# Patient Record
Sex: FEMALE | Race: WHITE | NOT HISPANIC OR LATINO
[De-identification: names, ages, dates, MRNs, and addresses within clinical notes are randomized per-mention and may not be internally consistent; named-entity substitution may affect disease eponyms.]

---

## 2020-10-02 PROBLEM — Z00.00 ENCOUNTER FOR PREVENTIVE HEALTH EXAMINATION: Status: ACTIVE | Noted: 2020-10-02

## 2020-10-07 ENCOUNTER — APPOINTMENT (OUTPATIENT)
Dept: BREAST CENTER | Facility: CLINIC | Age: 65
End: 2020-10-07
Payer: MEDICARE

## 2020-10-07 VITALS
HEART RATE: 72 BPM | WEIGHT: 144 LBS | BODY MASS INDEX: 26.5 KG/M2 | DIASTOLIC BLOOD PRESSURE: 84 MMHG | HEIGHT: 62 IN | SYSTOLIC BLOOD PRESSURE: 133 MMHG

## 2020-10-07 DIAGNOSIS — Z87.39 PERSONAL HISTORY OF OTHER DISEASES OF THE MUSCULOSKELETAL SYSTEM AND CONNECTIVE TISSUE: ICD-10-CM

## 2020-10-07 DIAGNOSIS — Z87.2 PERSONAL HISTORY OF DISEASES OF THE SKIN AND SUBCUTANEOUS TISSUE: ICD-10-CM

## 2020-10-07 DIAGNOSIS — Z87.891 PERSONAL HISTORY OF NICOTINE DEPENDENCE: ICD-10-CM

## 2020-10-07 DIAGNOSIS — Z86.79 PERSONAL HISTORY OF OTHER DISEASES OF THE CIRCULATORY SYSTEM: ICD-10-CM

## 2020-10-07 DIAGNOSIS — Z78.9 OTHER SPECIFIED HEALTH STATUS: ICD-10-CM

## 2020-10-07 PROCEDURE — 99205 OFFICE O/P NEW HI 60 MIN: CPT

## 2020-10-07 RX ORDER — ATENOLOL 50 MG/1
TABLET ORAL
Refills: 0 | Status: ACTIVE | COMMUNITY

## 2020-10-07 RX ORDER — SIMVASTATIN 80 MG/1
TABLET, FILM COATED ORAL
Refills: 0 | Status: ACTIVE | COMMUNITY

## 2020-10-07 RX ORDER — LORAZEPAM 2 MG/1
TABLET ORAL
Refills: 0 | Status: ACTIVE | COMMUNITY

## 2020-10-07 NOTE — CONSULT LETTER
[Dear  ___] : Dear  [unfilled], [( Thank you for referring [unfilled] for consultation for _____ )] : Thank you for referring [unfilled] for consultation for [unfilled] [Please see my note below.] : Please see my note below. [Consult Closing:] : Thank you very much for allowing me to participate in the care of this patient.  If you have any questions, please do not hesitate to contact me. [Sincerely,] : Sincerely, [FreeTextEntry3] : Alexsandra London MS DO\par Breast Surgeon\par [facility] \[place] [address]\par  [DrTara  ___] : Dr. SORENSEN

## 2020-10-07 NOTE — HISTORY OF PRESENT ILLNESS
[FreeTextEntry1] : This is a 64 year old female referred by Dr. Calle for a newly diagnosed right breast DCIS. She presented for screening imaging 9/9/2020 and an interval development of right breast microcalcifications was seen spanning [id number]. She is s/p three site stereotactic bx at [facility] on 9/23/2020 pathology of 12:00 posterior (top hat clip) showed DCIS, high grade; right 12:00 anterior (booker clip) DCIS, high grade; right 12N4 (barbell clip) DCIS high grade. The DCIS was thought to be histologically similar and receptors were ran on the last specimen and were ER 0%, NV 0%. She presents today for surgical consult.\par Denies any trauma, changes to herbs, diet or supplements. She has never had prior breast biopsy.\par  \par She does SBE. \par She has not noticed a change in her breast or a breast lump.\par She has not noticed a change in her nipple or nipple area.\par She has not noticed a change in the skin of the breast. except post bx bruising\par She is experiencing occasional clear left nipple discharge.\par She is not experiencing breast pain.\par She has not noticed a lump or lymph node under the armpit. \par \par BREAST CANCER RISK FACTORS\par Menarche: 10\par Date of LMP: 2005\par Menopause: post age 54\par Grav:  3    Para: 2 (41 yo son , 30 yo daughter ) \par Age at first live birth: 23 \par Nursed: no\par Hysterectomy: no\par Oophorectomy: yes partial left 1992\par OCP: yes in the past for about 2 years (IUD)\par HRT: no \par Last pap/pelvic exam: 2019 WNL \par Related family history: 2 maternal cousins DCIS age 59, and age 68\par Ashkenazi: no\par Mastery risk assessment: N/A\par BRCA testing: both 1st cousins, negative \par Bra size: 36C\par  \par Last mammogram: 9/9/2020                [facility]: [facility] \par Report reviewed.                                 Images reviewed on PACS and with patient\par Results: BIRADS 4\par heterogeneously dense breast tissue. Faint microcalicifications located in the right breast at approximately the 12:00 axis of the breast which extend over approximately 4cm in length and begins approximately 4cm from the nipple. The calcifications are mildly pleomorphic and vary somewhat in density and size without linear or branching forms identified. \par \par Last ultrasound:  9/9/2020                                 Location: [facility] \par Report reviewed.                                 Images reviewed in PACS\par Results: BIRADS 4\par No suspicious solid or cystic mass is identified. \par  \par Last MRI:  never                                            Location:\par Report reviewed.\par

## 2020-10-07 NOTE — REVIEW OF SYSTEMS
[Feeling Tired] : feeling tired [Recent Weight Gain (___ Lbs)] : recent [unfilled] ~Ulb weight gain [Night Sweats] : night sweats [Red Eyes] : red eyes [Eyesight Problems] : eyesight problems [Dry Eyes] : dryness of the eyes [Heart Rate Is Fast] : fast heart rate [Palpitations] : palpitations [Cough] : cough [Abdominal Pain] : abdominal pain [Diarrhea] : diarrhea [Heartburn] : heartburn [Joint Pain] : joint pain [Joint Swelling] : joint swelling [Joint Stiffness] : joint stiffness [Hand Pain] : hand pain [Hand Stiffness] : stiffness of the hand [Mid Back Pain] : mid back pain [Upper Back Pain] : upper back pain [Nipple Discharge] : nipple discharge [Dizziness] : dizziness [Sleep Disturbances] : sleep disturbances [Anxiety] : anxiety [Hot Flashes] : hot flashes [Decrease in Strength] : generalized decrease in strength [Loss of Hair] : loss of hair [Easy Bruising] : a tendency for easy bruising [Swollen Glands] : swollen glands [Negative] : Genitourinary

## 2020-10-07 NOTE — PHYSICAL EXAM
[Normocephalic] : normocephalic [Atraumatic] : atraumatic [Supple] : supple [No Supraclavicular Adenopathy] : no supraclavicular adenopathy [Examined in the supine and seated position] : examined in the supine and seated position [Symmetrical] : symmetrical [No dominant masses] : no dominant masses left breast [No Nipple Retraction] : no left nipple retraction [No Nipple Discharge] : no left nipple discharge [No Axillary Lymphadenopathy] : no left axillary lymphadenopathy [No Edema] : no edema [No Rashes] : no rashes [No Ulceration] : no ulceration [de-identified] : hematoma 12:00 mobile

## 2020-10-07 NOTE — ASSESSMENT
[FreeTextEntry1] : The pathology and imaging results were discussed. She is a stage 0 DCIS, ER- (AJCC 8th edition).\par \par An MRI is recommended for further evaluation of disease extent and the possible presence of multicentric or contralateral disease.  And to evaluate the extent of DCIS.\par \par The use of multimodality therapy for the treatment of breast cancer was discussed.  The surgical options include a lumpectomy to negative margins with radiation therapy versus a mastectomy with or without reconstruction.  Mastectomy techniques were discussed in detail including skin sparing, areolar sparing and nipple sparing techniques.  Long term follow up of nipple sparing techniques are not yet available.  The risks for nipple loss and high likelihood of sensation loss were discussed. The intranipple tissue would be examined pathologically and if any cancer of abnormal cells were found, a subsequent nipple excision would be recommended.\par \par Reconstructive options were discussed with the risks and benefits to each.  Implant based reconstruction with expander versus direct to implant techniques with/without alloderm were discussed as well as tissue flap procedures.  Reconstruction must be covered under state and federal laws.  Referral to a reconstructive surgeon was offered.\par Axillary staging is not generally necessary for DCIS.  There are certain situations where it  may be advisable to evaluate for axillary roula spread.  These include palpable DCIS, extensive DCIS especially with comedo necrosis (high grade), or when a mastectomy is planned.  The reasoning being that there is a higher chance of finding invasive disease in these circumstances or that when a mastectomy is performed, the sentinel node biopsy must be performed at the same time since the surgery disrupts the lymphatic flow and subsequent sentinel node evaluation would not be accurate.  If the sentinel lymph node is found to have metastatic disease either on the intra operative evaluation or on the final pathology, an axillary dissection may be recommended.  There is evidence that it may be necessary to complete an axillary dissection if one or two sentinel nodes are positive if the invasive cancer is less than 5 cm and treated by lumpectomy and conventional tangential field radiation.  The risks and benefits of surgery were discussed.\par \par The general indications for the use of chemotherapy were discussed but is dependent on the pathology results.  Chemotherapy would not be indicated for DCIS.  Hormonal therapy may be advised if the disease is ER+.  Not only can it help prevent recurrence of the already diagnosed cancer, it can help reduce the risk of a new primary tumor.  The patient will need to meet with a medical oncologist once those results of surgery are available.  The indications for radiation therapy and side effects were also discussed including use of accelerated and partial breast techniques.  The patient will need to meet with a radiation therapist if radiation is recommended.  The genetics of breast cancer were discussed with the implications for risk contralateral cancer and other associated cancers, options for management, and implications for family members. She is declining genetic testing at this time.\par Questions were answered. She received a cancer binder. \par I will call her after 2nd op pathology review and MRI to discuss her eligibility for breast conserving surgery. She would prefer BCS over mastectomy.\par She knows to call or return sooner should any concerns or questions arise.\par

## 2020-10-07 NOTE — PHYSICAL EXAM
[Normocephalic] : normocephalic [Atraumatic] : atraumatic [Supple] : supple [No Supraclavicular Adenopathy] : no supraclavicular adenopathy [Examined in the supine and seated position] : examined in the supine and seated position [Symmetrical] : symmetrical [No dominant masses] : no dominant masses left breast [No Nipple Retraction] : no left nipple retraction [No Nipple Discharge] : no left nipple discharge [No Axillary Lymphadenopathy] : no left axillary lymphadenopathy [No Edema] : no edema [No Rashes] : no rashes [No Ulceration] : no ulceration [de-identified] : hematoma 12:00 mobile

## 2020-10-07 NOTE — HISTORY OF PRESENT ILLNESS
[FreeTextEntry1] : This is a 64 year old female referred by Dr. Calle for a newly diagnosed right breast DCIS. She presented for screening imaging 9/9/2020 and an interval development of right breast microcalcifications was seen spanning [id number]. She is s/p three site stereotactic bx at [facility] on 9/23/2020 pathology of 12:00 posterior (top hat clip) showed DCIS, high grade; right 12:00 anterior (booker clip) DCIS, high grade; right 12N4 (barbell clip) DCIS high grade. The DCIS was thought to be histologically similar and receptors were ran on the last specimen and were ER 0%, DE 0%. She presents today for surgical consult.\par Denies any trauma, changes to herbs, diet or supplements. She has never had prior breast biopsy.\par  \par She does SBE. \par She has not noticed a change in her breast or a breast lump.\par She has not noticed a change in her nipple or nipple area.\par She has not noticed a change in the skin of the breast. except post bx bruising\par She is experiencing occasional clear left nipple discharge.\par She is not experiencing breast pain.\par She has not noticed a lump or lymph node under the armpit. \par \par BREAST CANCER RISK FACTORS\par Menarche: 10\par Date of LMP: 2005\par Menopause: post age 54\par Grav:  3    Para: 2 (43 yo son , 30 yo daughter ) \par Age at first live birth: 23 \par Nursed: no\par Hysterectomy: no\par Oophorectomy: yes partial left 1992\par OCP: yes in the past for about 2 years (IUD)\par HRT: no \par Last pap/pelvic exam: 2019 WNL \par Related family history: 2 maternal cousins DCIS age 59, and age 68\par Ashkenazi: no\par Mastery risk assessment: N/A\par BRCA testing: both 1st cousins, negative \par Bra size: 36C\par  \par Last mammogram: 9/9/2020                [facility]: [facility] \par Report reviewed.                                 Images reviewed on PACS and with patient\par Results: BIRADS 4\par heterogeneously dense breast tissue. Faint microcalicifications located in the right breast at approximately the 12:00 axis of the breast which extend over approximately 4cm in length and begins approximately 4cm from the nipple. The calcifications are mildly pleomorphic and vary somewhat in density and size without linear or branching forms identified. \par \par Last ultrasound:  9/9/2020                                 Location: [facility] \par Report reviewed.                                 Images reviewed in PACS\par Results: BIRADS 4\par No suspicious solid or cystic mass is identified. \par  \par Last MRI:  never                                            Location:\par Report reviewed.\par

## 2020-10-07 NOTE — CONSULT LETTER
[Dear  ___] : Dear  [unfilled], [( Thank you for referring [unfilled] for consultation for _____ )] : Thank you for referring [unfilled] for consultation for [unfilled] [Please see my note below.] : Please see my note below. [Consult Closing:] : Thank you very much for allowing me to participate in the care of this patient.  If you have any questions, please do not hesitate to contact me. [Sincerely,] : Sincerely, [FreeTextEntry3] : Alexsandra London MS DO\par Breast Surgeon\par [facility] [place] \[place] [address]\par  [DrTara  ___] : Dr. SORENSEN

## 2020-10-21 ENCOUNTER — NON-APPOINTMENT (OUTPATIENT)
Age: 65
End: 2020-10-21

## 2020-10-22 ENCOUNTER — NON-APPOINTMENT (OUTPATIENT)
Age: 65
End: 2020-10-22

## 2020-10-26 ENCOUNTER — APPOINTMENT (OUTPATIENT)
Dept: HEMATOLOGY ONCOLOGY | Facility: CLINIC | Age: 65
End: 2020-10-26
Payer: MEDICARE

## 2020-10-26 PROCEDURE — 99072 ADDL SUPL MATRL&STAF TM PHE: CPT

## 2020-10-26 PROCEDURE — 99499A: CUSTOM | Mod: NC

## 2020-11-17 ENCOUNTER — NON-APPOINTMENT (OUTPATIENT)
Age: 65
End: 2020-11-17

## 2020-12-04 ENCOUNTER — APPOINTMENT (OUTPATIENT)
Dept: BREAST CENTER | Facility: CLINIC | Age: 65
End: 2020-12-04
Payer: MEDICARE

## 2020-12-04 VITALS
SYSTOLIC BLOOD PRESSURE: 151 MMHG | HEART RATE: 77 BPM | BODY MASS INDEX: 27.6 KG/M2 | HEIGHT: 62 IN | DIASTOLIC BLOOD PRESSURE: 89 MMHG | WEIGHT: 150 LBS

## 2020-12-04 DIAGNOSIS — Z80.1 FAMILY HISTORY OF MALIGNANT NEOPLASM OF TRACHEA, BRONCHUS AND LUNG: ICD-10-CM

## 2020-12-04 PROCEDURE — 93702 BIS XTRACELL FLUID ANALYSIS: CPT

## 2020-12-04 PROCEDURE — 99215 OFFICE O/P EST HI 40 MIN: CPT | Mod: 25

## 2020-12-04 PROCEDURE — 99072 ADDL SUPL MATRL&STAF TM PHE: CPT

## 2020-12-04 NOTE — CONSULT LETTER
[Dear  ___] : Dear  [unfilled], [( Thank you for referring [unfilled] for consultation for _____ )] : Thank you for referring [unfilled] for consultation for [unfilled] [Please see my note below.] : Please see my note below. [Consult Closing:] : Thank you very much for allowing me to participate in the care of this patient.  If you have any questions, please do not hesitate to contact me. [Sincerely,] : Sincerely, [DrTara  ___] : Dr. SORENSEN [FreeTextEntry3] : Alexsandra London MS DO\par Breast Surgeon\par [facility] \[place] [address]\par

## 2020-12-04 NOTE — HISTORY OF PRESENT ILLNESS
[FreeTextEntry1] : This is a 65 year old female referred by Dr. Calle for a newly diagnosed right breast DCIS. She presented for screening imaging 9/9/2020 and an interval development of right breast microcalcifications was seen spanning [id number]. She is s/p three site stereotactic bx at [facility] on 9/23/2020 pathology of 12:00 posterior (top hat clip) showed DCIS, high grade; right 12:00 anterior (booker clip) DCIS, high grade; right 12N4 (barbell clip) DCIS high grade. The DCIS was thought to be histologically similar and receptors were ran on the last specimen and were ER 0%, AL 0%. She presents today for surgical consult.\par Denies any trauma, changes to herbs, diet or supplements. She has never had prior breast biopsy.\par Her sister has just been diagnosed with stage 3 lung cancer.\par \par She does SBE. \par She has not noticed a change in her breast or a breast lump.\par She has not noticed a change in her nipple or nipple area.\par She has not noticed a change in the skin of the breast. except post bx bruising\par She is experiencing occasional clear left nipple discharge.\par She is not experiencing breast pain.\par She has not noticed a lump or lymph node under the armpit. \par \par BREAST CANCER RISK FACTORS\par Menarche: 10\par Date of LMP: 2005\par Menopause: post age 54\par Grav:  3    Para: 2 (41 yo son , 28 yo daughter ) \par Age at first live birth: 23 \par Nursed: no\par Hysterectomy: no\par Oophorectomy: yes partial left 1992\par OCP: yes in the past for about 2 years (IUD)\par HRT: no \par Last pap/pelvic exam: 2019 WNL \par Related family history: 2 maternal cousins DCIS age 59, and age 68\par Ashkenazi: no\par Mastery risk assessment: N/A\par BRCA testing: Negative VUS DESIRE and BRCA2 \par Bra size: 36C\par  \par Last mammogram: 9/9/2020                Location: [facility] \par Report reviewed.                                 Images reviewed on PACS and with patient\par Results: BIRADS 4\par heterogeneously dense breast tissue. Faint microcalicifications located in the right breast at approximately the 12:00 axis of the breast which extend over approximately 4cm in length and begins approximately 4cm from the nipple. The calcifications are mildly pleomorphic and vary somewhat in density and size without linear or branching forms identified. \par \par Last ultrasound:  9/9/2020                                 Location: [facility] \par Report reviewed.                                 Images reviewed in PACS\par Results: BIRADS 4\par No suspicious solid or cystic mass is identified. \par  \par Last MRI:  10/21/2020                                            Location: [facility]\par Report reviewed.\par Results: right breast BIRADS 6 Left breast BIRADS 1\par right breast:nonmasslike, branching enhancement in the upper inner quadrant extending into the upper outer quadrant, demarcated by 3, concordant with the biopsy-proven area of DCIS.\par left breast: unremarkable, no suspicious findings.

## 2020-12-04 NOTE — ASSESSMENT
[FreeTextEntry1] : The pathology and imaging results were discussed. She is a right stage 0 DCIS, ER- (AJCC 8th edition).\par \par Magtrace injection was reviewed. Discussed if microinvasive or invasive cancer is found on pathology then sentinel lymph node biopsy would be recommended.\par \par Reviewed incisions\par plan nipple delay\par plan right NSM/WENDY\par A review postop care and recovery. I reviewed the risk of infection, bleeding, hematoma, seroma, deformity, scarring, and change in sensation, particularly in the nipple, with possible loss of sensation. She understands all of the above all of her questions have been answered.\par

## 2020-12-08 ENCOUNTER — APPOINTMENT (OUTPATIENT)
Dept: BREAST CENTER | Facility: HOSPITAL | Age: 65
End: 2020-12-08
Payer: MEDICARE

## 2020-12-08 PROCEDURE — 14000 TIS TRNFR TRUNK 10 SQ CM/<: CPT

## 2020-12-08 PROCEDURE — 19120 REMOVAL OF BREAST LESION: CPT | Mod: RT,59

## 2020-12-15 ENCOUNTER — NON-APPOINTMENT (OUTPATIENT)
Age: 65
End: 2020-12-15

## 2020-12-16 ENCOUNTER — APPOINTMENT (OUTPATIENT)
Dept: BREAST CENTER | Facility: CLINIC | Age: 65
End: 2020-12-16
Payer: MEDICARE

## 2020-12-16 PROCEDURE — 99024 POSTOP FOLLOW-UP VISIT: CPT

## 2020-12-16 NOTE — ASSESSMENT
[FreeTextEntry1] : doing well\par path reviewed copy given\par for nsm 12/18 and magtrace\par She knows to call or return sooner should any concerns or questions arise.\par

## 2020-12-16 NOTE — HISTORY OF PRESENT ILLNESS
[FreeTextEntry1] : This is a 65 year old female referred by Dr. Calle for a newly diagnosed right breast DCIS. She presented for screening imaging 9/9/2020 and an interval development of right breast microcalcifications was seen spanning [id number]. She is s/p three site stereotactic bx at [facility] on 9/23/2020 pathology of 12:00 posterior (top hat clip) showed DCIS, high grade; right 12:00 anterior (booker clip) DCIS, high grade; right 12N4 (barbell clip) DCIS high grade. The DCIS was thought to be histologically similar and receptors were ran on the last specimen and were ER 0%, MD 0%. She presents today for surgical consult.\par Denies any trauma, changes to herbs, diet or supplements. She has never had prior breast biopsy.\par Her sister has just been diagnosed with stage 3 lung cancer.\par \par She is s/p right nipple delay 12/8/20 path shows benign breast nipple duct tissue. She has no post op complaints.\par \par She does SBE. \par She has not noticed a change in her breast or a breast lump.\par She has not noticed a change in her nipple or nipple area.\par She has not noticed a change in the skin of the breast. except post bx bruising\par She is experiencing occasional clear left nipple discharge.\par She is not experiencing breast pain.\par She has not noticed a lump or lymph node under the armpit. \par \par BREAST CANCER RISK FACTORS\par Menarche: 10\par Date of LMP: 2005\par Menopause: post age 54\par Grav:  3    Para: 2 (43 yo son , 30 yo daughter ) \par Age at first live birth: 23 \par Nursed: no\par Hysterectomy: no\par Oophorectomy: yes partial left 1992\par OCP: yes in the past for about 2 years (IUD)\par HRT: no \par Last pap/pelvic exam: 2019 WNL \par Related family history: 2 maternal cousins DCIS age 59, and age 68\par Ashkenazi: no\par Mastery risk assessment: N/A\par BRCA testing: Negative VUS DESIRE and BRCA2 \par Bra size: 36C\par  \par Last mammogram: 9/9/2020                Location: [facility] \par Report reviewed.                                 Images reviewed on PACS and with patient\par Results: BIRADS 4\par heterogeneously dense breast tissue. Faint microcalicifications located in the right breast at approximately the 12:00 axis of the breast which extend over approximately 4cm in length and begins approximately 4cm from the nipple. The calcifications are mildly pleomorphic and vary somewhat in density and size without linear or branching forms identified. \par \par Last ultrasound:  9/9/2020                                 Location: [facility] \par Report reviewed.                                 Images reviewed in PACS\par Results: BIRADS 4\par No suspicious solid or cystic mass is identified. \par  \par Last MRI:  10/21/2020                                            Location: [facility]\par Report reviewed.\par Results: right breast BIRADS 6 Left breast BIRADS 1\par right breast:nonmasslike, branching enhancement in the upper inner quadrant extending into the upper outer quadrant, demarcated by 3, concordant with the biopsy-proven area of DCIS.\par left breast: unremarkable, no suspicious findings.

## 2020-12-18 ENCOUNTER — APPOINTMENT (OUTPATIENT)
Dept: BREAST CENTER | Facility: HOSPITAL | Age: 65
End: 2020-12-18
Payer: MEDICARE

## 2020-12-18 PROCEDURE — 19303 MAST SIMPLE COMPLETE: CPT | Mod: RT,78,22

## 2020-12-18 PROCEDURE — 38790 INJECT FOR LYMPHATIC X-RAY: CPT | Mod: RT,59,78

## 2020-12-23 ENCOUNTER — APPOINTMENT (OUTPATIENT)
Dept: BREAST CENTER | Facility: CLINIC | Age: 65
End: 2020-12-23
Payer: MEDICARE

## 2020-12-23 PROCEDURE — 99024 POSTOP FOLLOW-UP VISIT: CPT

## 2020-12-23 NOTE — HISTORY OF PRESENT ILLNESS
[FreeTextEntry1] : This is a 65 year old female referred by Dr. Calle for a newly diagnosed right breast DCIS. She presented for screening imaging 9/9/2020 and an interval development of right breast microcalcifications was seen spanning [id number]. She is s/p three site stereotactic bx at [facility] on 9/23/2020 pathology of 12:00 posterior (top hat clip) showed DCIS, high grade; right 12:00 anterior (booker clip) DCIS, high grade; right 12N4 (barbell clip) DCIS high grade. The DCIS was thought to be histologically similar and receptors were ran on the last specimen and were ER 0%, ME 0%. She presents today for surgical consult.\par Denies any trauma, changes to herbs, diet or supplements. She has never had prior breast biopsy.\par Her sister has just been diagnosed with stage 3 lung cancer.\par \par She is s/p right nipple delay 12/8/20 path shows benign breast nipple duct tissue. She has no post op complaints.\par She is s/p R NSM 12/18/20 path pending. Doing overall ok post op.\par \par She does SBE. \par She has not noticed a change in her breast or a breast lump.\par She has not noticed a change in her nipple or nipple area.\par She has not noticed a change in the skin of the breast. except post bx bruising\par She is experiencing occasional clear left nipple discharge.\par She is not experiencing breast pain.\par She has not noticed a lump or lymph node under the armpit. \par \par BREAST CANCER RISK FACTORS\par Menarche: 10\par Date of LMP: 2005\par Menopause: post age 54\par Grav:  3    Para: 2 (41 yo son , 30 yo daughter ) \par Age at first live birth: 23 \par Nursed: no\par Hysterectomy: no\par Oophorectomy: yes partial left 1992\par OCP: yes in the past for about 2 years (IUD)\par HRT: no \par Last pap/pelvic exam: 2019 WNL \par Related family history: 2 maternal cousins DCIS age 59, and age 68\par Ashkenazi: no\par Mastery risk assessment: N/A\par BRCA testing: Negative VUS DESIRE and BRCA2 \par Bra size: 36C\par  \par Last mammogram: 9/9/2020                Location: [facility] \par Report reviewed.                                 Images reviewed on PACS and with patient\par Results: BIRADS 4\par heterogeneously dense breast tissue. Faint microcalicifications located in the right breast at approximately the 12:00 axis of the breast which extend over approximately 4cm in length and begins approximately 4cm from the nipple. The calcifications are mildly pleomorphic and vary somewhat in density and size without linear or branching forms identified. \par \par Last ultrasound:  9/9/2020                                 Location: [facility] \par Report reviewed.                                 Images reviewed in PACS\par Results: BIRADS 4\par No suspicious solid or cystic mass is identified. \par  \par Last MRI:  10/21/2020                                            Location: [facility]\par Report reviewed.\par Results: right breast BIRADS 6 Left breast BIRADS 1\par right breast:nonmasslike, branching enhancement in the upper inner quadrant extending into the upper outer quadrant, demarcated by 3, concordant with the biopsy-proven area of DCIS.\par left breast: unremarkable, no suspicious findings.

## 2020-12-23 NOTE — ASSESSMENT
[FreeTextEntry1] : doing well\par path pending will call asap\par rec med/onc cs after path available\par f/u 3 months\par She knows to call or return sooner should any concerns or questions arise.\par

## 2020-12-23 NOTE — PHYSICAL EXAM
[de-identified] : bruising along right breast as expected, viable NAC, blister at inferior edge of areola

## 2020-12-28 ENCOUNTER — NON-APPOINTMENT (OUTPATIENT)
Age: 65
End: 2020-12-28

## 2021-01-22 ENCOUNTER — NON-APPOINTMENT (OUTPATIENT)
Age: 66
End: 2021-01-22

## 2021-02-05 ENCOUNTER — APPOINTMENT (OUTPATIENT)
Dept: RADIATION ONCOLOGY | Facility: CLINIC | Age: 66
End: 2021-02-05
Payer: MEDICARE

## 2021-02-05 VITALS
HEART RATE: 67 BPM | OXYGEN SATURATION: 99 % | SYSTOLIC BLOOD PRESSURE: 160 MMHG | DIASTOLIC BLOOD PRESSURE: 74 MMHG | HEIGHT: 62 IN | TEMPERATURE: 98 F | WEIGHT: 150 LBS | RESPIRATION RATE: 15 BRPM | BODY MASS INDEX: 27.6 KG/M2

## 2021-02-05 PROCEDURE — 99204 OFFICE O/P NEW MOD 45 MIN: CPT

## 2021-02-05 PROCEDURE — 99072 ADDL SUPL MATRL&STAF TM PHE: CPT

## 2021-02-05 NOTE — VITALS
[Maximal Pain Intensity: 0/10] : 0/10 [Least Pain Intensity: 0/10] : 0/10 [80: Normal activity with effort; some signs or symptoms of disease.] : 80: Normal activity with effort; some signs or symptoms of disease.  [90: Minor restrictions in physically strenous activity.] : 90: Minor restrictions in physically strenuous activity. [ECOG Performance Status: 1 - Restricted in physically strenuous activity but ambulatory and able to carry out work of a light or sedentary nature] : Performance Status: 1 - Restricted in physically strenuous activity but ambulatory and able to carry out work of a light or sedentary nature, e.g., light house work, office work [Date: ____________] : Patient's last distress assessment performed on [unfilled]. [2 - Distress Level] : Distress Level: 2 [Patient given social work contact information and resource sheet] : Patient was given social work contact information and resource sheet

## 2021-02-05 NOTE — HISTORY OF PRESENT ILLNESS
[FreeTextEntry1] : Ms. Edwards is a 65 year old female referred here by Dr. London.\par \par On 9/9/2020 she presented for mammogram screening imaging which revealed interval development of right breast microcalcifications was seen spanning 4cm x 4cm at the 12:00 axis. \par \par On 9/23/2020 she had three site stereotactic biopsies at  [facility], pathology of 12:00 posterior (top hat clip) showed DCIS, high grade; right 12:00 anterior (booker clip) DCIS, high grade; right 12N4 (barbell clip) DCIS high grade. The DCIS was thought to be histologically similar and receptors were ran on the last specimen and were ER 0%, NE 0%. \par FINAL PATHOLOGIC DIAGNOSIS\par 1. Right breast posterior with calcifications, stereotactic biopsy (Outside slides; \par       CBL Path [id number] 1):\par      Ductal carcinoma in situ (DCIS), solid type Grade III without necrosis or  \par       calcification.\par      Focus of DCIS measures 0.5 mm.\par      No evidence of invasive carcinoma.\par 2. Right breast posterior without calcifications, stereotactic biopsy (Outside \par       slides; CBL Path [id number] 2):\par      Benign fibroglandular breast tissue with prominent fibrosis.\par 3. Right breast anterior, stereotactic biopsy (Outside slides; CBL Path \par       [id number] 3):\par      Ductal carcinoma in situ (DCIS), solid type Grade III without necrosis or  \par       calcification.\par      Focus of DCIS measures 0.5 mm.\par      No evidence of invasive carcinoma.\par 4. Right breast anterior, stereotactic biopsy (Outside slides; CBL Path \par       [id number]):\par      Benign fibroglandular breast tissue.\par 5. Right breast at 12:00 with calcifications, stereotactic biopsy (Outside slides; \par       CBL Path [id number] 5):\par      Ductal carcinoma in situ (DCIS), solid and flat type Grade III with expansile \par       necrosis. \par      DCIS present as multiple scattered 1-2 mm foci of DCIS.\par      Rare microcalcifications associated with DCIS.\par      No evidence of invasive carcinoma.\par     \par     Breast Biomarkers (as reported; no slides submitted):\par      Estrogen receptor: Negative (0% staining)\par      Progesterone receptor: Negative (0% staining)  \par \par 6. Right breast at 12:00 without calcifications, stereotactic biopsy (Outside slides; \par       CBL Path [id number] 6):\par      Ductal carcinoma in situ (DCIS), mostly solid type Grade III with focal necrosis.\par      DCIS present as multiple scattered 1-3 mm foci.\par      Microcalcifications not seen.\par      No evidence of invasive carcinoma.\par \par On 10/21/2020 she had an MRI of bilateral breasts which revealed right breast non masslike, branching enhancement in the upper inner quadrant extending into the upper outer quadrant, demarcated by 3 see markers, concordant with the biopsy-proven area of DCIS. BIRADS- 6. Left breast unremarkable, no suspicious findings BIRADS-1. \par \par On 12/8/2020 he had base of the right nipple biopsy\par Right breast DCIS   \par FINAL PATHOLOGIC DIAGNOSIS\par \par BASE OF RIGHT NIPPLE:\par -BENIGN BREAST/NIPPLE DUCT TYPE TISSUE. \par \par On 1/18/2021- Dr. London\par Procedure:  Nipple sparing mastectomy\par Specimen Laterality:  Right\par Tumor Site: Central\par      \par Size (extent) of DCIS:\par Estimated size (extent) of the DCIS is at least:  60 mm (6.0 cm).  Seen on 12 slides\par \par Histologic Type:\par 	Architectural Pattern:  Solid\par             Nuclear Grade:  High\par             Necrosis: Present\par \par Margins:  Uninvolved by DCIS\par Distance from closest margin (mm):  < 1 mm (DCIS spans 4 mm in this area)\par         Specify closest margin:  Anterior superior just medial to midline\par         Distance from other margins\par              Retroareolar:  9 mm\par 	  Posterior:  > 15 mm\par 	\par Regional Lymph Nodes:  Uninvolved by Tumor Cells (H&E and cytokeratin stains)\par 		Number of lymph nodes examined:  8\par 		Number of sentinel lymph nodes examined:  0\par Immunohistochemistry Results (Block 39)\par 	Estrogen Receptor (ER):  Negative (< 1%)\par 	Progesterone Receptor (NE):  Negative (< 1%)\par Additional Pathologic Findings:  3 Healing biopsy sites all associated with DCIS; Atypical lobular hyperplasia (ALH); Fibroadenomatoid change;  Apocrine metaplasia\par \par Previous Specimen:  [id number] (Benign base of right nipple); [id number] (Rt breast core biopsies: DCIS, outside slide review)\par Distant Metastasis confirmed pathologically:  N/A\par Pathologic Stage Classification (AJCC 8th ed):  pTis(DCIS) N0\par Prognostic Stage Group (AJCC 8th ed): As per managing physician\par \par FINAL PATHOLOGIC DIAGNOSIS\par  \par RIGHT NIPPLE SPARING MASTECTOMY:\par -  DUCTAL CARCINOMA IN-SITU (DCIS)\par -  SOLID ARCHITECTURE\par -  HIGH NUCLEAR GRADE\par -  WITH CENTRAL NECROSIS\par -  WITH ASSOCIATED CALCIFICATIONS\par -  WITH EXTENSION INTO LOBULES\par -  CENTRAL ASPECT OF THE MASTECTOMY\par -  SPANS APPROXIMATELY 6 CM\par -  PRESENT ON 12 SLIDES\par -  < 1 MM FROM ANTERIOR MARGIN SUPERIORLY, JUST MEDIAL TO MIDLINE IN AN AREA THAT SPANS 4 MM\par -  9 MM AWAY FROM RETROAREOLAR INKED SURFACE\par -  > 15 MM FROM POSTERIOR MARGIN\par -  ESTROGEN RECEPTOR (ER) NEGATIVE (<1%)\par -  PROGESTERONE RECEPTOR (NE) NEGATIVE (<1%)\par -  ASSOCIATED WITH 3 HEALING BIOPSY SITES\par -  NO DIAGNOSTIC EVIDENCE OF MICROINVASION (BREAST TRIPLE STAIN ON MULTIPLE BLOCKS SHOWS AN INTACT MYOEPITHELIAL CELL LAYER (P63 AND CK5/6 POSITIVE) AROUND CYTOKERATIN 8/18 POSITIVE DCIS CELLS) \par -  ATYPICAL LOBULAR HYPERPLASIA (ALH), SEVERAL FOCI.  E-CADHERIN STAIN IS NEGATIVE SUPPORTING THE DIAGNOSIS\par -  FIBROADENOMATOID CHANGE AND APOCRINE METAPLASIA\par -  8 LYMPH NODES, NEGATIVE FOR CARCINOMA ON H&E AND CYTOKERATIN STAINS\par \par PATIENT'S PREVIOUS RIGHT BREAST PATHOLOGY\par CORE BIOPSIES ([id number]; OUTSIDE SLIDE REVIEW CBL PATH)\par RIGHT BREAST POSTERIOR:  DCIS, GRADE III\par RIGHT BREAST ANTERIOR:  DCIS, GRADE III\par RIGHT BREAST 12:00:  DCIS, GRADE III, WITH NECROSIS, ER NEG, NE NEG\par BASE OF RIGHT NIPPLE ([id number]; 12/8/20):  BENIGN  \par \par She presents today for a consult. She is undergoing some reconstruction of the breast by Dr. Mueller. She states she is healing well from the surgery. She complains of slight discomfort intermittently but unable to rate it. She complains of some numbness and tingling on her right nipple and breast, she take Neurontin once a night, she is unsure of the dose. She has full ROM on bilateral upper extremities, no edema noted. She is eating and drinking well.

## 2021-02-05 NOTE — OB/GYN HISTORY
[History of Birth Control Pills] : Patient has a history of taking birth control pills [___] : Living: [unfilled] [History of Hormone Replacement Therapy] : no history of hormone replacement therapy

## 2021-02-05 NOTE — PHYSICAL EXAM
[Breast Palpation Mass] : no palpable masses [No UE Edema] : there is no upper extremity edema [Normal] : oriented to person, place and time, the affect was normal, the mood was normal and not anxious [de-identified] : Well-healing mastectomy scar with reconstruction on right side with minimal discharge.  No cutaneous lesions noted.  No lymph nodes palpable in right axilla.  No lumps palpable in the left breast

## 2021-02-05 NOTE — REVIEW OF SYSTEMS
[Edema Limbs: Grade 0] : Edema Limbs: Grade 0  [Fatigue: Grade 1 - Fatigue relieved by rest] : Fatigue: Grade 1 - Fatigue relieved by rest [Breast Pain: Grade 0] : Breast Pain: Grade 0 [Peripheral Sensory Neuropathy: Grade 1 - Asymptomatic; loss of deep tendon reflexes or paresthesia] : Peripheral Sensory Neuropathy: Grade 1 - Asymptomatic; loss of deep tendon reflexes or paresthesia [Anxiety: Grade 1 - Mild symptoms; intervention not indicated] : Anxiety: Grade 1 - Mild symptoms; intervention not indicated [Depression: Grade 0] : Depression: Grade 0 [Insomnia: Grade 1 - Mild difficulty falling asleep, staying asleep or waking up early] : Insomnia: Grade 1 - Mild difficulty falling asleep, staying asleep or waking up early

## 2021-02-08 ENCOUNTER — NON-APPOINTMENT (OUTPATIENT)
Age: 66
End: 2021-02-08

## 2021-04-07 ENCOUNTER — APPOINTMENT (OUTPATIENT)
Dept: BREAST CENTER | Facility: CLINIC | Age: 66
End: 2021-04-07
Payer: MEDICARE

## 2021-04-07 VITALS
HEIGHT: 62 IN | DIASTOLIC BLOOD PRESSURE: 86 MMHG | WEIGHT: 150 LBS | SYSTOLIC BLOOD PRESSURE: 151 MMHG | BODY MASS INDEX: 27.6 KG/M2 | HEART RATE: 77 BPM

## 2021-04-07 PROCEDURE — 93702 BIS XTRACELL FLUID ANALYSIS: CPT

## 2021-04-07 PROCEDURE — 99215 OFFICE O/P EST HI 40 MIN: CPT | Mod: 25

## 2021-04-07 PROCEDURE — 99072 ADDL SUPL MATRL&STAF TM PHE: CPT

## 2021-04-07 NOTE — PHYSICAL EXAM
[Normocephalic] : normocephalic [Atraumatic] : atraumatic [Supple] : supple [No Supraclavicular Adenopathy] : no supraclavicular adenopathy [Examined in the supine and seated position] : examined in the supine and seated position [No dominant masses] : no dominant masses in right breast  [No dominant masses] : no dominant masses left breast [No Nipple Retraction] : no left nipple retraction [No Nipple Discharge] : no left nipple discharge [No Axillary Lymphadenopathy] : no left axillary lymphadenopathy [No Edema] : no edema [No Rashes] : no rashes [No Ulceration] : no ulceration [de-identified] : viable NAC, there is crease in upper pole and nipple is pointing upwards [de-identified] : L>R

## 2021-04-07 NOTE — HISTORY OF PRESENT ILLNESS
[FreeTextEntry1] : This is a 65 year old female referred by Dr. Calle for a newly diagnosed right breast DCIS. She presented for screening imaging 9/9/2020 and an interval development of right breast microcalcifications was seen spanning [id number]. She is s/p three site stereotactic bx at [facility] on 9/23/2020 pathology of 12:00 posterior (top hat clip) showed DCIS, high grade; right 12:00 anterior (booker clip) DCIS, high grade; right 12N4 (barbell clip) DCIS high grade. The DCIS was thought to be histologically similar and receptors were ran on the last specimen and were ER 0%, MS 0%. She presents today for surgical consult.\par Denies any trauma, changes to herbs, diet or supplements. She has never had prior breast biopsy.\par Her sister has just been diagnosed with stage 3 lung cancer.\par \par She is s/p right nipple delay 12/8/20 path shows benign breast nipple duct tissue. She has no post op complaints.\par She is s/p R NSM 12/18/20 path showed  6cm DCIS, high grade closest margin <1mm on a 4mm front, anterior/superior at skin, 0/8slne ER<1%, MS <1%, several foci ALH. \par \par She does SBE. \par She has not noticed a change in her breast or a breast lump.\par She has not noticed a change in her nipple or nipple area.\par She has not noticed a change in the skin of the breast. except post bx bruising\par She is experiencing occasional clear left nipple discharge.\par She is not experiencing breast pain.\par She has not noticed a lump or lymph node under the armpit. \par \par BREAST CANCER RISK FACTORS\par Menarche: 10\par Date of LMP: 2005\par Menopause: post age 54\par Grav:  3    Para: 2 (41 yo son , 30 yo daughter ) \par Age at first live birth: 23 \par Nursed: no\par Hysterectomy: no\par Oophorectomy: yes partial left 1992\par OCP: yes in the past for about 2 years (IUD)\par HRT: no \par Last pap/pelvic exam: 2019 WNL \par Related family history: 2 maternal cousins DCIS age 59, and age 68\par Ashkenazi: no\par Mastery risk assessment: N/A\par BRCA testing: Negative VUS DESIRE and BRCA2 \par Bra size: 36C\par  \par Last mammogram: 9/9/2020                Location: [facility] \par Report reviewed.                                 Images reviewed on PACS and with patient\par Results: BIRADS 4\par heterogeneously dense breast tissue. Faint microcalicifications located in the right breast at approximately the 12:00 axis of the breast which extend over approximately 4cm in length and begins approximately 4cm from the nipple. The calcifications are mildly pleomorphic and vary somewhat in density and size without linear or branching forms identified. \par \par Last ultrasound:  9/9/2020                                 Location: [facility] \par Report reviewed.                                 Images reviewed in PACS\par Results: BIRADS 4\par No suspicious solid or cystic mass is identified. \par  \par Last MRI:  10/21/2020                                            Location: [facility]\par Report reviewed.\par Results: right breast BIRADS 6 Left breast BIRADS 1\par right breast:nonmasslike, branching enhancement in the upper inner quadrant extending into the upper outer quadrant, demarcated by 3, concordant with the biopsy-proven area of DCIS.\par left breast: unremarkable, no suspicious findings.

## 2021-04-07 NOTE — ASSESSMENT
[FreeTextEntry1] : 66 yo female with right DCIS\par referral to Dr. Andrea for med/onc consult, she hasn't scheduled it\par LDEX today\par for symmetrizing left reduction per Dr. Mueller\par f/u 3 months\par She knows to call or return sooner should any concerns or questions arise.\par

## 2021-04-27 ENCOUNTER — APPOINTMENT (OUTPATIENT)
Dept: HEMATOLOGY ONCOLOGY | Facility: CLINIC | Age: 66
End: 2021-04-27
Payer: MEDICARE

## 2021-04-27 VITALS
TEMPERATURE: 97.9 F | OXYGEN SATURATION: 98 % | HEART RATE: 90 BPM | HEIGHT: 61 IN | SYSTOLIC BLOOD PRESSURE: 139 MMHG | DIASTOLIC BLOOD PRESSURE: 85 MMHG | RESPIRATION RATE: 17 BRPM | BODY MASS INDEX: 28.13 KG/M2 | WEIGHT: 149 LBS

## 2021-04-27 PROCEDURE — 99072 ADDL SUPL MATRL&STAF TM PHE: CPT

## 2021-04-27 PROCEDURE — 99215 OFFICE O/P EST HI 40 MIN: CPT

## 2021-04-27 NOTE — PHYSICAL EXAM
[Normal] : affect appropriate [de-identified] : right mastecomty  at times  , no masses or lesions bilaterally

## 2021-04-27 NOTE — HISTORY OF PRESENT ILLNESS
[de-identified] : This is a 65-year-old woman presenting for a new diagnosis of DCIS.\par Patient had a screening mammogram in September 2020 she developed  4 cm of microcalcifications, these were biopsied on 9/23/2020 the 12 o'clock position showed DCIS high-grade, posterior, 12 clock position anterior DCIS high-grade in the third was also DCIS high-grade.  DCIS throughout was similar and ER/GA were 0% and 0% respectively.  Patient was then seen by Dr. Zepeda in October 2020.\par MRI of the breast in October 2020 showed branching enhancement in the upper inner quadrant extending into the upper outer quadrant in the right breast concordant with DCIS\par in dec 2020 biopsy of nipple was negative for cancer preop. \par Patient underwent nipple sparing mastectomy on the right on December 18, 2020 with immediate reconstruction with Sherrie flap with Dr. Zepeda and Dr. Mueller.  The extent of the DCIS was 6 cm, high-grade, again ER/GA were both negative at 1% respectively.  Final staging was stage 1, pT tis N0\par 8 lymph nodes were obtained and all negative for cancer.  Margins were clear(closest was <1mm) \par there was also ALH present. \par \par BRCA testing negative variant of undetermined significance of both  DESIRE and BRCA2( heterozygous for both) \par \par Patient was seen by Dr. Zuñiga in February 2021, and the patient decided not to proceed with radiation after extensive discussions.\par \par  [de-identified] : feeling well , still recovering from surgery \par usually walks a lot , but is having some decreased exercise tolerance \par \par saw dr castellon \par \par surgery in jan 2021, then march 2021 \par \par vaccination went  well \par work for a nurse \par has another surgery planned with dr nobles

## 2021-04-27 NOTE — ASSESSMENT
[FreeTextEntry1] : This is a 65-year-old postmenopausal woman presenting with extensive 6 cm focus of DCIS ER negative GA negative node negative.  Status post mastectomy with Sherrie flap reconstruction.\par Also with small focus of ALH\par \par 1) DCIS \par Reviewed the pathology and diagnosis at length with patient and  today\par Explained the concept of proliferative breast disease\par The DCIS was negative for ER and GA explained that this would indicate that antiestrogens would not help protect the patient against recurrence of this particular type of breast cancer.\par We will continue to monitor closely with mammogram and follow-up of breast surgery\par Check markers today\par \par 2) ALH \par Discussed the concept of proliferative breast disease, atypical lobular hyperplasia can be an estrogen driven process\par With the use of antiestrogens to reduce the risk of an estrogen positive breast cancer.\par Explained that the use of antiestrogens would not reduce the risk of recurrence of an ER negative breast cancer as was already found in this patient.\par Given that she does have 1 residual breast on the left antiestrogens could be considered to protect this breast against developing another breast cancer that was ER positive\par Discussed tamoxifen versus aromatase inhibitors at length.\par Reviewed the mechanism, efficacy, side effects.\par Tamoxifen has the added benefit of improving bone density but increase the risk of endometrial cancer blood clots.  Patient is not in favor.  In addition aromatase inhibitors can lower bone density and cause joint pains which again the patient is not in favor of.\par Advised bone density, if patient's bone density is low can consider Evista which can help protect against breast cancer, and help bone density as well.  It is still associated with increased risk of DVT PE and stroke\par \par 3) osteopenia \par check dexa \par cont vit d check level \par \par 4) vit d def \par Continue vitamin D check level\par \par 5) iron deficiency anemia\par Check iron levels\par \par All the above discussed with the patient and her  at length time spent over 1 hour\par Follow-up in about 1 month.  When she comes in to see Dr. Zepeda.\par Patient will be proceeding with surgery with Dr. Mueller\par

## 2021-04-27 NOTE — REASON FOR VISIT
[Initial Consultation] : an initial consultation [FreeTextEntry2] : here for DCIS , chemoprevention discussion

## 2021-04-28 ENCOUNTER — TRANSCRIPTION ENCOUNTER (OUTPATIENT)
Age: 66
End: 2021-04-28

## 2021-05-02 LAB
25(OH)D3 SERPL-MCNC: 88.3 NG/ML
ALBUMIN SERPL ELPH-MCNC: 4.2 G/DL
ALP BLD-CCNC: 158 U/L
ALT SERPL-CCNC: 34 U/L
ANION GAP SERPL CALC-SCNC: 21 MMOL/L
AST SERPL-CCNC: 29 U/L
BILIRUB SERPL-MCNC: 0.3 MG/DL
BUN SERPL-MCNC: 25 MG/DL
CALCIUM SERPL-MCNC: 10.3 MG/DL
CANCER AG15-3 SERPL-ACNC: 39.5 U/ML
CEA SERPL-MCNC: 1.7 NG/ML
CHLORIDE SERPL-SCNC: 100 MMOL/L
CO2 SERPL-SCNC: 19 MMOL/L
CREAT SERPL-MCNC: 0.62 MG/DL
FERRITIN SERPL-MCNC: 441 NG/ML
FOLATE SERPL-MCNC: 12.9 NG/ML
GLUCOSE SERPL-MCNC: 57 MG/DL
IRON SATN MFR SERPL: 15 %
IRON SERPL-MCNC: 34 UG/DL
POTASSIUM SERPL-SCNC: 3.8 MMOL/L
PROT SERPL-MCNC: 7.1 G/DL
SODIUM SERPL-SCNC: 140 MMOL/L
TIBC SERPL-MCNC: 231 UG/DL
UIBC SERPL-MCNC: 198 UG/DL
VIT B12 SERPL-MCNC: >2000 PG/ML

## 2021-05-13 ENCOUNTER — NON-APPOINTMENT (OUTPATIENT)
Age: 66
End: 2021-05-13

## 2021-06-29 ENCOUNTER — APPOINTMENT (OUTPATIENT)
Dept: HEMATOLOGY ONCOLOGY | Facility: CLINIC | Age: 66
End: 2021-06-29
Payer: MEDICARE

## 2021-06-29 VITALS
TEMPERATURE: 97.8 F | BODY MASS INDEX: 28.7 KG/M2 | SYSTOLIC BLOOD PRESSURE: 166 MMHG | OXYGEN SATURATION: 99 % | RESPIRATION RATE: 18 BRPM | HEIGHT: 61 IN | HEART RATE: 71 BPM | DIASTOLIC BLOOD PRESSURE: 88 MMHG | WEIGHT: 152 LBS

## 2021-06-29 PROCEDURE — 99072 ADDL SUPL MATRL&STAF TM PHE: CPT

## 2021-06-29 PROCEDURE — 99214 OFFICE O/P EST MOD 30 MIN: CPT

## 2021-06-29 NOTE — PHYSICAL EXAM
[Normal] : affect appropriate [de-identified] : right mastecomty with reconstruction timo flap  , no masses or lesions bilaterally

## 2021-06-29 NOTE — HISTORY OF PRESENT ILLNESS
[de-identified] : This is a 65-year-old woman presenting for a new diagnosis of DCIS.\par Patient had a screening mammogram in September 2020 she developed  4 cm of microcalcifications, these were biopsied on 9/23/2020 the 12 o'clock position showed DCIS high-grade, posterior, 12 clock position anterior DCIS high-grade in the third was also DCIS high-grade.  DCIS throughout was similar and ER/GA were 0% and 0% respectively.  Patient was then seen by Dr. Zepeda in October 2020.\par MRI of the breast in October 2020 showed branching enhancement in the upper inner quadrant extending into the upper outer quadrant in the right breast concordant with DCIS\par in dec 2020 biopsy of nipple was negative for cancer preop. \par Patient underwent nipple sparing mastectomy on the right on December 18, 2020 with immediate reconstruction with Sherrie flap with Dr. Zepeda and Dr. Mueller.  The extent of the DCIS was 6 cm, high-grade, again ER/GA were both negative at 1% respectively.  Final staging was stage 1, pT tis N0\par 8 lymph nodes were obtained and all negative for cancer.  Margins were clear(closest was <1mm) \par there was also ALH present. \par \par BRCA testing negative variant of undetermined significance of both  DESIRE and BRCA2( heterozygous for both) \par \par Patient was seen by Dr. Zuñiga in February 2021, and the patient decided not to proceed with radiation after extensive discussions.\par \par  [de-identified] : feeling well \par not interested in any ai or serm \par dexa- osteopenia may 2021 \par saw dr nobles , breast is softer , will see again in july 2021 \par 3000 units a day of vit d \par still has some back pain due disk problems  not any worse \par

## 2021-06-29 NOTE — ASSESSMENT
[FreeTextEntry1] : This is a 65-year-old postmenopausal woman presenting with extensive 6 cm focus of DCIS ER negative OR negative node negative.  Status post mastectomy with Sherrie flap reconstruction.\par Also with small focus of ALH\par \par 1) DCIS \par er negative \par Again the role of antiestrogen such as selective estrogen receptor modulators or aromatase inhibitors is unclear in this space.\par Continue to monitor closely with mammogram ultrasound\par Patient has follow-up with Dr. Zepeda and Dr. Mueller\par \par 2) ALH \par Again discussed to that ALH can be a predictive marker for estrogen driven disease and even though the DCIS was estrogen negative she may still benefit from antiestrogens to reduce the risk of future estrogen positive breast events.  Thereby reducing the need for biopsy and further surgeries.\par Also discussed that selective estrogen receptor modulators can help with bone density and given that she has low bone density this could be considered.\par However the patient is not in favor of Evista or tamoxifen due to the increased risk of thrombosis\par She is also not in favor of aromatase inhibitors due to the potential for decline in bone density\par For now we will continue close observation with mammal ultrasound and consider MRI as well.\par follow up dr castellon \par \par 3) osteopenia \par Viewed bone density revealing for osteopenia, discussed the need for exercise and vitamin D also discussed selective estrogen receptor modulators but patient declines\par \par 4) vit d def \par Continue vitamin D check level\par \par 5) iron deficiency anemia\par Check iron levels\par \par 6) obesity \par advise weight loss and exercise \par \par Discussed with patient and spouse at length.\par Follow-up in 6 months.\par \par

## 2021-07-07 ENCOUNTER — NON-APPOINTMENT (OUTPATIENT)
Age: 66
End: 2021-07-07

## 2021-07-07 LAB
25(OH)D3 SERPL-MCNC: 54.4 NG/ML
CANCER AG15-3 SERPL-ACNC: 48.6 U/ML
CEA SERPL-MCNC: 2.1 NG/ML
FERRITIN SERPL-MCNC: 205 NG/ML
FOLATE SERPL-MCNC: 8.4 NG/ML
IRON SATN MFR SERPL: 52 %
IRON SERPL-MCNC: 163 UG/DL
TIBC SERPL-MCNC: 314 UG/DL
UIBC SERPL-MCNC: 152 UG/DL
VIT B12 SERPL-MCNC: 830 PG/ML

## 2021-07-21 ENCOUNTER — APPOINTMENT (OUTPATIENT)
Dept: HEMATOLOGY ONCOLOGY | Facility: CLINIC | Age: 66
End: 2021-07-21

## 2021-07-21 ENCOUNTER — APPOINTMENT (OUTPATIENT)
Dept: BREAST CENTER | Facility: CLINIC | Age: 66
End: 2021-07-21
Payer: MEDICARE

## 2021-07-21 VITALS
DIASTOLIC BLOOD PRESSURE: 87 MMHG | HEART RATE: 69 BPM | WEIGHT: 150 LBS | SYSTOLIC BLOOD PRESSURE: 142 MMHG | BODY MASS INDEX: 28.32 KG/M2 | HEIGHT: 61 IN

## 2021-07-21 VITALS
HEIGHT: 61 IN | BODY MASS INDEX: 28.32 KG/M2 | WEIGHT: 150 LBS | SYSTOLIC BLOOD PRESSURE: 146 MMHG | HEART RATE: 66 BPM | TEMPERATURE: 98 F | DIASTOLIC BLOOD PRESSURE: 76 MMHG | OXYGEN SATURATION: 96 % | RESPIRATION RATE: 18 BRPM

## 2021-07-21 DIAGNOSIS — Z87.440 PERSONAL HISTORY OF URINARY (TRACT) INFECTIONS: ICD-10-CM

## 2021-07-21 DIAGNOSIS — Z78.9 OTHER SPECIFIED HEALTH STATUS: ICD-10-CM

## 2021-07-21 DIAGNOSIS — Z80.0 FAMILY HISTORY OF MALIGNANT NEOPLASM OF DIGESTIVE ORGANS: ICD-10-CM

## 2021-07-21 PROCEDURE — 93702 BIS XTRACELL FLUID ANALYSIS: CPT

## 2021-07-21 PROCEDURE — 99072 ADDL SUPL MATRL&STAF TM PHE: CPT

## 2021-07-21 PROCEDURE — 99214 OFFICE O/P EST MOD 30 MIN: CPT | Mod: 25

## 2021-07-21 NOTE — PHYSICAL EXAM
[Normocephalic] : normocephalic [Atraumatic] : atraumatic [Supple] : supple [No Supraclavicular Adenopathy] : no supraclavicular adenopathy [Examined in the supine and seated position] : examined in the supine and seated position [No dominant masses] : no dominant masses in right breast  [No dominant masses] : no dominant masses left breast [No Nipple Retraction] : no left nipple retraction [No Nipple Discharge] : no left nipple discharge [No Axillary Lymphadenopathy] : no left axillary lymphadenopathy [No Edema] : no edema [No Rashes] : no rashes [No Ulceration] : no ulceration [de-identified] : viable NAC, there is crease in upper pole and nipple is improved [de-identified] : L>R

## 2021-07-21 NOTE — ASSESSMENT
[FreeTextEntry1] : 64 yo female with right DCIS\par plan left Mg/sono 9/2021\par cont surveillance per Dr. Andrea\par LDEX today and next one in DEC 2021\par for symmetrizing left reduction per Dr. Mueller\par f/u DEC 2021\par cont moisturizer to nac daily\par She knows to call or return sooner should any concerns or questions arise.\par

## 2021-07-21 NOTE — HISTORY OF PRESENT ILLNESS
[FreeTextEntry1] : This is a 65 year old female referred by Dr. Calle for a newly diagnosed right breast DCIS. She presented for screening imaging 9/9/2020 and an interval development of right breast microcalcifications was seen spanning [id number]. She is s/p three site stereotactic bx at [facility] on 9/23/2020 pathology of 12:00 posterior (top hat clip) showed DCIS, high grade; right 12:00 anterior (booker clip) DCIS, high grade; right 12N4 (barbell clip) DCIS high grade. The DCIS was thought to be histologically similar and receptors were ran on the last specimen and were ER 0%, VT 0%. She presents today for surgical consult.\par Denies any trauma, changes to herbs, diet or supplements. She has never had prior breast biopsy.\par Her sister has just been diagnosed with stage 3 lung cancer.\par \par She is s/p right nipple delay 12/8/20 path shows benign breast nipple duct tissue. She has no post op complaints.\par She is s/p R NSM 12/18/20 path showed  6cm DCIS, high grade closest margin <1mm on a 4mm front, anterior/superior at skin, 0/8slne ER<1%, VT <1%, several foci ALH. \par \par She does SBE. \par She has not noticed a change in her breast or a breast lump.\par She has not noticed a change in her nipple or nipple area.\par She has not noticed a change in the skin of the breast. except post bx bruising\par She is experiencing occasional clear left nipple discharge.\par She is not experiencing breast pain.\par She has not noticed a lump or lymph node under the armpit. \par \par BREAST CANCER RISK FACTORS\par Menarche: 10\par Date of LMP: 2005\par Menopause: post age 54\par Grav:  3    Para: 2 (43 yo son , 28 yo daughter ) \par Age at first live birth: 23 \par Nursed: no\par Hysterectomy: no\par Oophorectomy: yes partial left 1992\par OCP: yes in the past for about 2 years (IUD)\par HRT: no \par Last pap/pelvic exam: 2019 WNL \par Related family history: 2 maternal cousins DCIS age 59, and age 68\par Ashkenazi: no\par Mastery risk assessment: N/A\par BRCA testing: Negative VUS DESIRE and BRCA2 \par Bra size: 36C\par  \par Last mammogram: 9/9/2020                Location: [facility] \par Report reviewed.                                 Images reviewed on PACS and with patient\par Results: BIRADS 4\par heterogeneously dense breast tissue. Faint microcalicifications located in the right breast at approximately the 12:00 axis of the breast which extend over approximately 4cm in length and begins approximately 4cm from the nipple. The calcifications are mildly pleomorphic and vary somewhat in density and size without linear or branching forms identified. \par \par Last ultrasound:  9/9/2020                                 Location: [facility] \par Report reviewed.                                 Images reviewed in PACS\par Results: BIRADS 4\par No suspicious solid or cystic mass is identified. \par  \par Last MRI:  10/21/2020                                            Location: [facility]\par Report reviewed.\par Results: right breast BIRADS 6 Left breast BIRADS 1\par right breast:nonmasslike, branching enhancement in the upper inner quadrant extending into the upper outer quadrant, demarcated by 3, concordant with the biopsy-proven area of DCIS.\par left breast: unremarkable, no suspicious findings.

## 2021-07-22 LAB
AFP-TM SERPL-MCNC: 4.5 NG/ML
CANCER AG15-3 SERPL-ACNC: 44.7 U/ML
CEA SERPL-MCNC: 2 NG/ML
FERRITIN SERPL-MCNC: 222 NG/ML

## 2021-07-30 LAB — HFE GENE MUT ANL BLD/T: NORMAL

## 2021-08-03 LAB — TM INTERPRETATION: NORMAL

## 2021-12-01 ENCOUNTER — APPOINTMENT (OUTPATIENT)
Dept: BREAST CENTER | Facility: CLINIC | Age: 66
End: 2021-12-01
Payer: MEDICARE

## 2021-12-01 ENCOUNTER — NON-APPOINTMENT (OUTPATIENT)
Age: 66
End: 2021-12-01

## 2021-12-01 VITALS
HEART RATE: 79 BPM | DIASTOLIC BLOOD PRESSURE: 89 MMHG | HEIGHT: 61 IN | SYSTOLIC BLOOD PRESSURE: 140 MMHG | BODY MASS INDEX: 27.38 KG/M2 | WEIGHT: 145 LBS

## 2021-12-01 PROCEDURE — 99214 OFFICE O/P EST MOD 30 MIN: CPT

## 2021-12-01 NOTE — PHYSICAL EXAM
[Normocephalic] : normocephalic [Atraumatic] : atraumatic [Supple] : supple [No Supraclavicular Adenopathy] : no supraclavicular adenopathy [Examined in the supine and seated position] : examined in the supine and seated position [No dominant masses] : no dominant masses in right breast  [No dominant masses] : no dominant masses left breast [No Nipple Retraction] : no left nipple retraction [No Nipple Discharge] : no left nipple discharge [No Axillary Lymphadenopathy] : no left axillary lymphadenopathy [No Edema] : no edema [No Rashes] : no rashes [No Ulceration] : no ulceration [de-identified] : viable NAC, there is crease in upper pole and nipple is improved [de-identified] : L>R

## 2021-12-01 NOTE — ASSESSMENT
[FreeTextEntry1] : 65 yo female with right DCIS\par plan left Mg/sono 9/2022\par cont surveillance per Dr. Andrea\par LDEX today and next one in JUN 2022\par for symmetrizing left reduction per Dr. Mueller\par f/u 6 months\par cont moisturizer to nac daily\par She knows to call or return sooner should any concerns or questions arise.\par

## 2021-12-01 NOTE — HISTORY OF PRESENT ILLNESS
[FreeTextEntry1] : This is a 66 year old female referred by Dr. Calle for a newly diagnosed right breast DCIS. She presented for screening imaging 9/9/2020 and an interval development of right breast microcalcifications was seen spanning [id number]. She is s/p three site stereotactic bx at [facility] on 9/23/2020 pathology of 12:00 posterior (top hat clip) showed DCIS, high grade; right 12:00 anterior (booker clip) DCIS, high grade; right 12N4 (barbell clip) DCIS high grade. The DCIS was thought to be histologically similar and receptors were ran on the last specimen and were ER 0%, W[place] 0%. She presents today for surgical consult.\par Denies any trauma, changes to herbs, diet or supplements. She has never had prior breast biopsy.\par Her sister has just been diagnosed with stage 3 lung cancer.\par \par She is s/p right nipple delay 12/8/20 path shows benign breast nipple duct tissue. She has no post op complaints.\par She is s/p R NSM 12/18/20 path showed  6cm DCIS, high grade closest margin <1mm on a 4mm front, anterior/superior at skin, 0/8slne ER<1%, [place] <1%, several foci ALH. She had revision surgery 6 weeks ago with Dr. Mueller. \par \par She does SBE. \par She has not noticed a change in her breast or a breast lump.\par She has not noticed a change in her nipple or nipple area.\par She has not noticed a change in the skin of the breast. except post bx bruising\par She is experiencing occasional clear left nipple discharge.\par She is not experiencing breast pain.\par She has not noticed a lump or lymph node under the armpit. \par \par BREAST CANCER RISK FACTORS\par Menarche: 10\par Date of LMP: 2005\par Menopause: post age 54\par Grav:  3    Para: 2 (41 yo son , 30 yo daughter ) \par Age at first live birth: 23 \par Nursed: no\par Hysterectomy: no\par Oophorectomy: yes partial left 1992\par OCP: yes in the past for about 2 years (IUD)\par HRT: no \par Last pap/pelvic exam: 2019 WNL \par Related family history: 2 maternal cousins DCIS age 59, and age 68\par Ashkenazi: no\par Mastery risk assessment: N/A\par BRCA testing: Negative VUS DESIRE and BRCA2 \par Bra size: 36C\par  \par Last mammogram: 9/15/2021               Location: [facility] \par Report reviewed.                                 Images reviewed on PACS and with patient\par Results: BIRADS 1\par dense breasts. stable examination with no evidence of malignancy.  \par \par Last ultrasound:  9/15/2021                 Location: [facility] \par Report reviewed.                                 Images reviewed in PACS\par Results: BIRADS 1\par No suspicious solid or cystic mass is identified. \par  \par Last MRI:  10/21/2020                                            Location: [facility]\par Report reviewed.\par Results: right breast BIRADS 6 Left breast BIRADS 1\par right breast:nonmasslike, branching enhancement in the upper inner quadrant extending into the upper outer quadrant, demarcated by 3, concordant with the biopsy-proven area of DCIS.\par left breast: unremarkable, no suspicious findings.

## 2022-01-12 ENCOUNTER — APPOINTMENT (OUTPATIENT)
Dept: HEMATOLOGY ONCOLOGY | Facility: CLINIC | Age: 67
End: 2022-01-12
Payer: COMMERCIAL

## 2022-01-12 VITALS
HEART RATE: 69 BPM | DIASTOLIC BLOOD PRESSURE: 89 MMHG | SYSTOLIC BLOOD PRESSURE: 161 MMHG | OXYGEN SATURATION: 98 % | WEIGHT: 145 LBS | TEMPERATURE: 98.1 F | RESPIRATION RATE: 18 BRPM | BODY MASS INDEX: 27.38 KG/M2 | HEIGHT: 61 IN

## 2022-01-12 PROCEDURE — 99213 OFFICE O/P EST LOW 20 MIN: CPT

## 2022-01-12 NOTE — PHYSICAL EXAM
[Normal] : affect appropriate [de-identified] : b/l breast asymmetrical. s/p right mastectomy with reconstruction timo flap  , soft crease noted to R upper pole, no masses or lesions bilaterally. multiple well healed surgical incisions. no lymphadenopathy to b/l axilla.  [de-identified] : well healed transverse surgical incision.

## 2022-01-12 NOTE — HISTORY OF PRESENT ILLNESS
[de-identified] : This is a 66-year-old woman presenting for a new diagnosis of DCIS.\par Patient had a screening mammogram in September 2020 she developed  4 cm of microcalcifications, these were biopsied on 9/23/2020 the 12 o'clock position showed DCIS high-grade, posterior, 12 clock position anterior DCIS high-grade in the third was also DCIS high-grade.  DCIS throughout was similar and ER/AR were 0% and 0% respectively.  Patient was then seen by Dr. Zepeda in October 2020.\par MRI of the breast in October 2020 showed branching enhancement in the upper inner quadrant extending into the upper outer quadrant in the right breast concordant with DCIS\par in dec 2020 biopsy of nipple was negative for cancer preop. \par Patient underwent nipple sparing mastectomy on the right on December 18, 2020 with immediate reconstruction with Sherrie flap with Dr. Zepeda and Dr. Mueller.  The extent of the DCIS was 6 cm, high-grade, again ER/AR were both negative at 1% respectively.  Final staging was stage 1, pT tis N0\par 8 lymph nodes were obtained and all negative for cancer.  Margins were clear(closest was <1mm) \par there was also ALH present. \par \par BRCA testing negative variant of undetermined significance of both  DESIRE and BRCA2( heterozygous for both) \par \par Patient was seen by Dr. Zuñiga in February 2021, and the patient decided not to proceed with radiation after extensive discussions.\par \par  [de-identified] : no new complaints\par had questions about prolia \par had vaccine and booster .

## 2022-01-12 NOTE — REVIEW OF SYSTEMS
[Joint Stiffness] : joint stiffness [Negative] : Allergic/Immunologic [FreeTextEntry9] : chronic arthritis

## 2022-01-12 NOTE — ASSESSMENT
[FreeTextEntry1] : This is a 66-year-old postmenopausal woman presenting with extensive 6 cm focus of DCIS ER negative SD negative node negative.  Status post mastectomy with Sherrie flap reconstruction.\par Also with small focus of ALH\par \par 1) DCIS \par er negative \par Again the role of antiestrogen such as selective estrogen receptor modulators or aromatase inhibitors is unclear in this space.\par Continue to monitor closely with mammogram ultrasound\par s/p follow up with Dr. Zepeda and Dr. Mueller\par repeat markers\par \par 2) ALH \par Again discussed to that ALH can be a predictive marker for estrogen driven disease and even though the DCIS was estrogen negative she may still benefit from antiestrogens to reduce the risk of future estrogen positive breast events.  Thereby reducing the need for biopsy and further surgeries.\par Also discussed that selective estrogen receptor modulators can help with bone density and given that she has low bone density this could be considered.\par However the patient is not in favor of Evista or tamoxifen due to the increased risk of thrombosis\par She is also not in favor of aromatase inhibitors due to the potential for decline in bone density\par For now we will continue close observation with mammal ultrasound and consider MRI as well.\par follow up dr castellon \par plan for left Mammo/sono 09/2022\par \par 3) osteopenia \par Viewed bone density revealing for osteopenia, discussed the need for exercise and vitamin D also discussed selective estrogen receptor modulators but patient declines.\par Discussed role of both prolia and zometa, including risk of ONJ, flu like reactions, and renal insufficiency with zometa. \par Pt does not wish to proceed with zometa , but will think about prolia. \par \par 4) vit d def \par Continue vitamin D check level\par \par 5) iron deficiency anemia\par pt stopped oral iron temporarily then recently restarted\par prior ferritin elevated- repeat today\par HC panel negative \par \par 6) obesity \par re-advised weight loss and exercise \par \par Discussed with patient at length\par Follow-up in 6 months.\par \par

## 2022-01-14 LAB
25(OH)D3 SERPL-MCNC: 63.3 NG/ML
CANCER AG15-3 SERPL-ACNC: 48.7 U/ML
CEA SERPL-MCNC: 1.9 NG/ML
FERRITIN SERPL-MCNC: 221 NG/ML
FOLATE SERPL-MCNC: >20 NG/ML
VIT B12 SERPL-MCNC: 1389 PG/ML

## 2022-01-21 ENCOUNTER — TRANSCRIPTION ENCOUNTER (OUTPATIENT)
Age: 67
End: 2022-01-21

## 2022-02-13 NOTE — REVIEW OF SYSTEMS
[Negative] : Allergic/Immunologic
L knee NT 2/2 ex fix/no Active ROM deficits were identified/deficits as listed below

## 2022-02-24 ENCOUNTER — NON-APPOINTMENT (OUTPATIENT)
Age: 67
End: 2022-02-24

## 2022-06-29 ENCOUNTER — APPOINTMENT (OUTPATIENT)
Dept: HEMATOLOGY ONCOLOGY | Facility: CLINIC | Age: 67
End: 2022-06-29

## 2022-06-29 VITALS
HEIGHT: 61 IN | WEIGHT: 145 LBS | BODY MASS INDEX: 27.38 KG/M2 | OXYGEN SATURATION: 98 % | SYSTOLIC BLOOD PRESSURE: 150 MMHG | TEMPERATURE: 98 F | RESPIRATION RATE: 18 BRPM | HEART RATE: 67 BPM | DIASTOLIC BLOOD PRESSURE: 85 MMHG

## 2022-06-29 PROCEDURE — 99214 OFFICE O/P EST MOD 30 MIN: CPT

## 2022-06-30 LAB
25(OH)D3 SERPL-MCNC: 47.3 NG/ML
CEA SERPL-MCNC: 2.2 NG/ML
FERRITIN SERPL-MCNC: 170 NG/ML
FOLATE SERPL-MCNC: >20 NG/ML
IRON SATN MFR SERPL: 35 %
IRON SERPL-MCNC: 121 UG/DL
TIBC SERPL-MCNC: 351 UG/DL
UIBC SERPL-MCNC: 230 UG/DL
VIT B12 SERPL-MCNC: 922 PG/ML

## 2022-07-01 NOTE — REVIEW OF SYSTEMS
[Joint Stiffness] : joint stiffness [Negative] : Allergic/Immunologic [Anxiety] : anxiety [Depression] : depression [FreeTextEntry9] : chronic arthritis  [de-identified] : area of R breast is tender to touch.  [de-identified] : very stressed today- sister just passed away .

## 2022-07-01 NOTE — ASSESSMENT
[FreeTextEntry1] : This is a 66-year-old postmenopausal woman presenting with extensive 6 cm focus of DCIS ER negative VT negative node negative.  Status post mastectomy with Sherrie flap reconstruction.\par Also with small focus of ALH\par \par 1) DCIS \par er negative \par Again the role of antiestrogen such as selective estrogen receptor modulators or aromatase inhibitors is unclear in this space.\par Continue to monitor closely with mammogram ultrasound. Check R breast ultrasound now given tender area upon palpation. \par s/p follow up with Dr. Zepeda and Dr. Mueller\par repeat markers today\par \par 2) ALH \par Again discussed to that ALH can be a predictive marker for estrogen driven disease and even though the DCIS was estrogen negative she may still benefit from antiestrogens to reduce the risk of future estrogen positive breast events.  Thereby reducing the need for biopsy and further surgeries.\par Also discussed that selective estrogen receptor modulators can help with bone density and given that she has low bone density this could be considered.\par However the patient is not in favor of Evista or tamoxifen due to the increased risk of thrombosis\par She is also not in favor of aromatase inhibitors due to the potential for decline in bone density\par For now we will continue close observation with mammal ultrasound and consider MRI as well.\par follow up dr castellon \par plan for left Mammo/sono 09/2022\par \par 3) osteopenia \par Viewed bone density revealing for osteopenia, discussed the need for exercise and vitamin D also discussed selective estrogen receptor modulators but patient declines.\par Discussed role of both prolia and zometa, including risk of ONJ, flu like reactions, and renal insufficiency with zometa. \par Pt does not wish to proceed with zometa , but will think about prolia. \par need dental evaluation first. \par \par 4) vit d def \par Continue vitamin D check level\par \par 5) iron deficiency anemia\par pt stopped oral iron temporarily then recently restarted\par prior ferritin elevated- repeat today\par HC panel negative \par \par 6) pt was given extensive emotional support today. her sister had just passed away. \par \par Discussed with patient at length\par Follow-up in 3 months, sooner PRN \par \par

## 2022-07-01 NOTE — HISTORY OF PRESENT ILLNESS
[de-identified] : This is a 66-year-old woman presenting for a new diagnosis of DCIS.\par Patient had a screening mammogram in September 2020 she developed  4 cm of microcalcifications, these were biopsied on 9/23/2020 the 12 o'clock position showed DCIS high-grade, posterior, 12 clock position anterior DCIS high-grade in the third was also DCIS high-grade.  DCIS throughout was similar and ER/FL were 0% and 0% respectively.  Patient was then seen by Dr. Zepeda in October 2020.\par MRI of the breast in October 2020 showed branching enhancement in the upper inner quadrant extending into the upper outer quadrant in the right breast concordant with DCIS\par in dec 2020 biopsy of nipple was negative for cancer preop. \par Patient underwent nipple sparing mastectomy on the right on December 18, 2020 with immediate reconstruction with Sherrie flap with Dr. Zepeda and Dr. Mueller.  The extent of the DCIS was 6 cm, high-grade, again ER/FL were both negative at 1% respectively.  Final staging was stage 1, pT tis N0\par 8 lymph nodes were obtained and all negative for cancer.  Margins were clear(closest was <1mm) \par there was also ALH present. \par \par BRCA testing negative variant of undetermined significance of both  DESIRE and BRCA2( heterozygous for both) \par \par Patient was seen by Dr. Zuñiga in February 2021, and the patient decided not to proceed with radiation after extensive discussions.\par \par  [de-identified] : [place] [facility] \par breast imaging due 09/2022- has scripts \par thought about prolia- has not seen dentist yet. \par

## 2022-07-01 NOTE — PHYSICAL EXAM
[Normal] : affect appropriate [de-identified] : b/l breast asymmetrical. s/p right mastectomy with reconstruction timo flap , soft crease noted to R upper pole, no masses or lesions bilaterally. multiple well healed surgical incisions. no lymphadenopathy to b/l axilla.  [de-identified] : well healed transverse surgical incision.

## 2022-07-05 LAB — CANCER AG15-3 SERPL-ACNC: 43.4 U/ML

## 2022-07-06 ENCOUNTER — NON-APPOINTMENT (OUTPATIENT)
Age: 67
End: 2022-07-06

## 2022-07-20 ENCOUNTER — APPOINTMENT (OUTPATIENT)
Dept: BREAST CENTER | Facility: CLINIC | Age: 67
End: 2022-07-20

## 2022-07-20 VITALS
SYSTOLIC BLOOD PRESSURE: 159 MMHG | BODY MASS INDEX: 27.38 KG/M2 | HEIGHT: 61 IN | DIASTOLIC BLOOD PRESSURE: 89 MMHG | WEIGHT: 145 LBS | HEART RATE: 82 BPM

## 2022-07-20 PROCEDURE — 99214 OFFICE O/P EST MOD 30 MIN: CPT

## 2022-07-20 RX ORDER — FUROSEMIDE 20 MG/1
20 TABLET ORAL
Qty: 90 | Refills: 0 | Status: ACTIVE | COMMUNITY
Start: 2022-06-15

## 2022-07-20 RX ORDER — EPINEPHRINE 0.3 MG/.3ML
0.3 INJECTION INTRAMUSCULAR
Qty: 2 | Refills: 0 | Status: ACTIVE | COMMUNITY
Start: 2022-06-15

## 2022-07-20 RX ORDER — AZELASTINE HYDROCHLORIDE 0.5 MG/ML
0.05 SOLUTION/ DROPS OPHTHALMIC
Qty: 6 | Refills: 0 | Status: ACTIVE | COMMUNITY
Start: 2022-05-04

## 2022-07-20 NOTE — ASSESSMENT
[FreeTextEntry1] : 67 yo female with right DCIS\par plan left Mg/sono 9/2022\par plan MRI 12/2022\par cont surveillance per Dr. Jeter\par LDEX today and next one prn\par referred her to PCP to discuss BP concerns\par rec d/w Dr. Jeter re: tumor markers\par f/u 1 year\par \par She knows to call or return sooner should any concerns or questions arise.\par

## 2022-07-20 NOTE — PHYSICAL EXAM
[Normocephalic] : normocephalic [Atraumatic] : atraumatic [Supple] : supple [No Supraclavicular Adenopathy] : no supraclavicular adenopathy [Examined in the supine and seated position] : examined in the supine and seated position [No dominant masses] : no dominant masses in right breast  [No dominant masses] : no dominant masses left breast [No Nipple Retraction] : no left nipple retraction [No Nipple Discharge] : no left nipple discharge [No Axillary Lymphadenopathy] : no left axillary lymphadenopathy [No Edema] : no edema [No Rashes] : no rashes [No Ulceration] : no ulceration [Symmetrical] : symmetrical [de-identified] : viable NAC, there is crease in upper pole and nipple is improved [de-identified] : s/p reduction mammoplasty

## 2022-08-11 ENCOUNTER — TRANSCRIPTION ENCOUNTER (OUTPATIENT)
Age: 67
End: 2022-08-11

## 2022-12-28 ENCOUNTER — NON-APPOINTMENT (OUTPATIENT)
Age: 67
End: 2022-12-28

## 2022-12-28 ENCOUNTER — APPOINTMENT (OUTPATIENT)
Dept: HEMATOLOGY ONCOLOGY | Facility: CLINIC | Age: 67
End: 2022-12-28

## 2023-01-05 ENCOUNTER — RESULT REVIEW (OUTPATIENT)
Age: 68
End: 2023-01-05

## 2023-01-05 ENCOUNTER — APPOINTMENT (OUTPATIENT)
Dept: HEMATOLOGY ONCOLOGY | Facility: CLINIC | Age: 68
End: 2023-01-05
Payer: MEDICARE

## 2023-01-05 VITALS
WEIGHT: 153 LBS | HEIGHT: 61 IN | OXYGEN SATURATION: 96 % | DIASTOLIC BLOOD PRESSURE: 78 MMHG | SYSTOLIC BLOOD PRESSURE: 144 MMHG | TEMPERATURE: 98.3 F | BODY MASS INDEX: 28.89 KG/M2 | RESPIRATION RATE: 18 BRPM | HEART RATE: 68 BPM

## 2023-01-05 PROCEDURE — 99213 OFFICE O/P EST LOW 20 MIN: CPT | Mod: 25

## 2023-01-05 RX ORDER — METHYLPREDNISOLONE 4 MG/1
4 TABLET ORAL
Qty: 21 | Refills: 0 | Status: COMPLETED | COMMUNITY
Start: 2022-05-16 | End: 2023-01-05

## 2023-01-05 RX ORDER — MULTIVIT-MIN/IRON/FOLIC ACID/K 18-600-40
CAPSULE ORAL
Refills: 0 | Status: ACTIVE | COMMUNITY

## 2023-01-05 RX ORDER — CHROMIUM 200 MCG
400 TABLET ORAL
Refills: 0 | Status: ACTIVE | COMMUNITY

## 2023-01-05 RX ORDER — BACILLUS COAGULANS/INULIN 1B-250 MG
CAPSULE ORAL
Refills: 0 | Status: ACTIVE | COMMUNITY

## 2023-01-05 RX ORDER — FUROSEMIDE 80 MG/1
TABLET ORAL
Refills: 0 | Status: COMPLETED | COMMUNITY
End: 2023-01-05

## 2023-01-05 RX ORDER — GABAPENTIN 100 MG/1
CAPSULE ORAL
Refills: 0 | Status: COMPLETED | COMMUNITY
End: 2023-01-05

## 2023-01-05 RX ORDER — ALPRAZOLAM 0.5 MG/1
0.5 TABLET ORAL
Qty: 3 | Refills: 0 | Status: COMPLETED | COMMUNITY
Start: 2020-10-07 | End: 2023-01-05

## 2023-01-05 RX ORDER — LUTEIN 6 MG
TABLET ORAL
Refills: 0 | Status: ACTIVE | COMMUNITY

## 2023-01-05 RX ORDER — AMOXICILLIN AND CLAVULANATE POTASSIUM 875; 125 MG/1; MG/1
875-125 TABLET, COATED ORAL
Qty: 20 | Refills: 0 | Status: COMPLETED | COMMUNITY
Start: 2022-03-28 | End: 2023-01-05

## 2023-01-05 RX ORDER — GINSENG 100 MG
CAPSULE ORAL
Refills: 0 | Status: ACTIVE | COMMUNITY

## 2023-01-08 NOTE — PHYSICAL EXAM
[Normal] : affect appropriate [Restricted in physically strenuous activity but ambulatory and able to carry out work of a light or sedentary nature] : Status 1- Restricted in physically strenuous activity but ambulatory and able to carry out work of a light or sedentary nature, e.g., light house work, office work [de-identified] : b/l breast asymmetrical. s/p right mastectomy with reconstruction timo flap , soft crease noted to R upper pole, no masses or lesions bilaterally. multiple well healed surgical incisions. no lymphadenopathy to b/l axilla.  [de-identified] : well healed transverse surgical incision.

## 2023-01-08 NOTE — REVIEW OF SYSTEMS
[Joint Stiffness] : joint stiffness [Anxiety] : anxiety [Depression] : depression [Negative] : Allergic/Immunologic [FreeTextEntry9] : chronic arthritis  [de-identified] : area of R breast is tender to touch.  [de-identified] : very stressed today- sister just passed away .

## 2023-01-08 NOTE — ASSESSMENT
[FreeTextEntry1] : This is a 66-year-old postmenopausal woman presenting with extensive 6 cm focus of DCIS ER negative [place] negative node negative.  Status post mastectomy with Sherrie flap reconstruction.\par Also with small focus of ALH\par \par 1) DCIS \par er negative \par Again the role of antiestrogen such as selective estrogen receptor modulators or aromatase inhibitors is unclear in this space.\par Continue to monitor closely with mammogram ultrasound --10/22 --nl\par \par 2. osteopenia \par  5/21 --osteopenia\par \par 3. vit d def \par Continue vitamin D check level\par \par Follow-up in 3 months\par \par

## 2023-01-08 NOTE — HISTORY OF PRESENT ILLNESS
[de-identified] : This is a 66-year-old woman presenting for a new diagnosis of DCIS.\par Patient had a screening mammogram in September 2020 she developed  4 cm of microcalcifications, these were biopsied on 9/23/2020 the 12 o'clock position showed DCIS high-grade, posterior, 12 clock position anterior DCIS high-grade in the third was also DCIS high-grade.  DCIS throughout was similar and ER/TN were 0% and 0% respectively.  Patient was then seen by Dr. Zepeda in October 2020.\par MRI of the breast in October 2020 showed branching enhancement in the upper inner quadrant extending into the upper outer quadrant in the right breast concordant with DCIS\par in dec 2020 biopsy of nipple was negative for cancer preop. \par Patient underwent nipple sparing mastectomy on the right on December 18, 2020 with immediate reconstruction with Sherrie flap with Dr. Zepeda and Dr. Mueller.  The extent of the DCIS was 6 cm, high-grade, again ER/TN were both negative at 1% respectively.  Final staging was stage 1, pT tis N0\par 8 lymph nodes were obtained and all negative for cancer.  Margins were clear(closest was <1mm) \par there was also ALH present. \par Rt deferred\par \par BRCA testing negative variant of undetermined significance of both  DESIRE and BRCA2( heterozygous for both) \par \par \par \par  [de-identified] : sister passed away - brain cancer.  at [facility]

## 2023-04-25 ENCOUNTER — TRANSCRIPTION ENCOUNTER (OUTPATIENT)
Age: 68
End: 2023-04-25

## 2023-07-20 DIAGNOSIS — R79.89 OTHER SPECIFIED ABNORMAL FINDINGS OF BLOOD CHEMISTRY: ICD-10-CM

## 2023-07-24 ENCOUNTER — APPOINTMENT (OUTPATIENT)
Dept: BREAST CENTER | Facility: CLINIC | Age: 68
End: 2023-07-24

## 2023-07-24 ENCOUNTER — RESULT REVIEW (OUTPATIENT)
Age: 68
End: 2023-07-24

## 2023-07-24 ENCOUNTER — APPOINTMENT (OUTPATIENT)
Dept: HEMATOLOGY ONCOLOGY | Facility: CLINIC | Age: 68
End: 2023-07-24
Payer: MEDICARE

## 2023-07-24 VITALS
TEMPERATURE: 97.8 F | HEART RATE: 69 BPM | SYSTOLIC BLOOD PRESSURE: 143 MMHG | OXYGEN SATURATION: 97 % | WEIGHT: 151 LBS | HEIGHT: 61 IN | DIASTOLIC BLOOD PRESSURE: 82 MMHG | BODY MASS INDEX: 28.51 KG/M2 | RESPIRATION RATE: 18 BRPM

## 2023-07-24 LAB
FERRITIN SERPL-MCNC: 141 NG/ML
IRON SATN MFR SERPL: 33 %
IRON SERPL-MCNC: 123 UG/DL
TIBC SERPL-MCNC: 372 UG/DL
UIBC SERPL-MCNC: 249 UG/DL

## 2023-07-24 PROCEDURE — 99213 OFFICE O/P EST LOW 20 MIN: CPT | Mod: 25

## 2023-07-24 PROCEDURE — 36415 COLL VENOUS BLD VENIPUNCTURE: CPT

## 2023-07-24 NOTE — PHYSICAL EXAM
[Restricted in physically strenuous activity but ambulatory and able to carry out work of a light or sedentary nature] : Status 1- Restricted in physically strenuous activity but ambulatory and able to carry out work of a light or sedentary nature, e.g., light house work, office work [Normal] : affect appropriate [de-identified] : b/l breast asymmetrical. s/p right mastectomy with reconstruction timo flap , soft crease noted to R upper pole, no masses or lesions bilaterally. multiple well healed surgical incisions. no lymphadenopathy to b/l axilla.  [de-identified] : well healed transverse surgical incision.

## 2023-07-24 NOTE — ASSESSMENT
[FreeTextEntry1] : This is a 66-year-old postmenopausal woman presenting with extensive 6 cm focus of DCIS ER negative [place] negative node negative.  Status post mastectomy with Sherrie flap reconstruction.\par Also with small focus of ALH\par \par 1) DCIS /ALH -- s/p rt. mastectomy\par ER negative \par Deferring prophylaxis for lt. breast\par continue mammogram/ MRI ( heterogeneously dense breasts)\par \par \par 2. osteopenia \par  5/21 --osteopenia\par \par 3. vit d def \par Continue vitamin D check level\par \par 4. colonosocpy discussed\par \par Follow-up in 6 months\par \par

## 2023-07-24 NOTE — REVIEW OF SYSTEMS
[Joint Stiffness] : joint stiffness [Anxiety] : anxiety [Depression] : depression [Negative] : Allergic/Immunologic [FreeTextEntry9] : chronic arthritis  [de-identified] : area of R breast is tender to touch.  [de-identified] : very stressed today- sister just passed away .

## 2023-07-24 NOTE — HISTORY OF PRESENT ILLNESS
[de-identified] : This is a 66-year-old woman presenting for a new diagnosis of DCIS.\par Patient had a screening mammogram in September 2020 she developed  4 cm of microcalcifications, these were biopsied on 9/23/2020 the 12 o'clock position showed DCIS high-grade, posterior, 12 clock position anterior DCIS high-grade in the third was also DCIS high-grade.  DCIS throughout was similar and ER/KS were 0% and 0% respectively.  Patient was then seen by Dr. Zepeda in October 2020.\par MRI of the breast in October 2020 showed branching enhancement in the upper inner quadrant extending into the upper outer quadrant in the right breast concordant with DCIS\par in dec 2020 biopsy of nipple was negative for cancer preop. \par Patient underwent nipple sparing mastectomy on the right on December 18, 2020 with immediate reconstruction with Sherrie flap with Dr. Zepeda and Dr. Mueller.  The extent of the DCIS was 6 cm, high-grade, again ER/KS were both negative at 1% respectively.  Final staging was stage 1, pT tis N0\par 8 lymph nodes were obtained and all negative for cancer.  Margins were clear(closest was <1mm) \par there was also ALH present. \par Rt deferred\par \par BRCA testing negative variant of undetermined significance of both  DESIRE and BRCA2( heterozygous for both) \par \par \par \par  [de-identified] : s/p left breast reduction

## 2023-08-07 ENCOUNTER — NON-APPOINTMENT (OUTPATIENT)
Age: 68
End: 2023-08-07

## 2023-08-07 ENCOUNTER — APPOINTMENT (OUTPATIENT)
Dept: BREAST CENTER | Facility: CLINIC | Age: 68
End: 2023-08-07
Payer: MEDICARE

## 2023-08-07 VITALS
HEIGHT: 61 IN | WEIGHT: 151 LBS | BODY MASS INDEX: 28.51 KG/M2 | DIASTOLIC BLOOD PRESSURE: 85 MMHG | SYSTOLIC BLOOD PRESSURE: 158 MMHG | HEART RATE: 67 BPM

## 2023-08-07 DIAGNOSIS — Z12.31 ENCOUNTER FOR SCREENING MAMMOGRAM FOR MALIGNANT NEOPLASM OF BREAST: ICD-10-CM

## 2023-08-07 DIAGNOSIS — R92.2 INCONCLUSIVE MAMMOGRAM: ICD-10-CM

## 2023-08-07 PROCEDURE — 99214 OFFICE O/P EST MOD 30 MIN: CPT

## 2023-08-07 NOTE — ASSESSMENT
[FreeTextEntry1] : 66 yo female with right DCIS plan left Mg/sono 10/2023 plan MRI 2/2024 rec ice and warm moist compresses daily for comfort will d/w Dr. Mueller cont surveillance per Dr. Jeter LDEX today and next one prn  rec d/w Dr. Jeter re: tumor markers f/u 1 year  She knows to call or return sooner should any concerns or questions arise.

## 2023-08-07 NOTE — PHYSICAL EXAM
[Normocephalic] : normocephalic [Atraumatic] : atraumatic [Supple] : supple [No Supraclavicular Adenopathy] : no supraclavicular adenopathy [Examined in the supine and seated position] : examined in the supine and seated position [Symmetrical] : symmetrical [No dominant masses] : no dominant masses in right breast  [No dominant masses] : no dominant masses left breast [No Nipple Retraction] : no left nipple retraction [No Nipple Discharge] : no left nipple discharge [No Axillary Lymphadenopathy] : no left axillary lymphadenopathy [No Edema] : no edema [No Rashes] : no rashes [No Ulceration] : no ulceration [de-identified] : viable NAC, there is crease in upper pole and nipple is improved [de-identified] : s/p reduction mammoplasty, in area of patient concern there is a red raised area which fluctuates

## 2023-08-07 NOTE — HISTORY OF PRESENT ILLNESS
[FreeTextEntry1] : This is a 67 year old female referred by Dr. Calle for a newly diagnosed right breast DCIS. She presented for screening imaging 9/9/2020 and an interval development of right breast microcalcifications was seen spanning [id number]. She is s/p three site stereotactic bx at [facility] on 9/23/2020 pathology of 12:00 posterior (top hat clip) showed DCIS, high grade; right 12:00 anterior (booker clip) DCIS, high grade; right 12N4 (barbell clip) DCIS high grade. The DCIS was thought to be histologically similar and receptors were ran on the last specimen and were ER 0%, OH 0%. She presents today for surgical consult. Denies any trauma, changes to herbs, diet or supplements. She has never had prior breast biopsy. Her sister has just been diagnosed with stage 3 lung cancer.  She is s/p right nipple delay 12/8/20 path shows benign breast nipple duct tissue. She has no post op complaints. She is s/p R NSM 12/18/20 path showed  6cm DCIS, high grade closest margin <1mm on a 4mm front, anterior/superior at skin, 0/8slne ER<1%, OH <1%, several foci ALH. She had revision surgery 6 weeks ago with Dr. Mueller. Her sister passed 4/27/2022 from stage 4 lung cancer. She had symmetrizing reduction with Dr. Mueller 10/2021. Patient gets occasional right nipple itching.  She had revision surgery with Dr. Mueller 4/25/23. She is c/o left nipple pain and lump since getting a bear-hug from her cousing 2 weeks ago.  She does SBE.  She has not noticed a change in her breast or a breast lump. She has noticed a change in her left nipple or nipple area.  She has not noticed a change in the skin of the breast. except post bx bruising She is not experiencing nipple discharge. She is not experiencing breast pain. She has not noticed a lump or lymph node under the armpit.   BREAST CANCER RISK FACTORS Menarche: 10 Date of LMP: 2005 Menopause: post age 54 Grav:  3    Para: 2 (43 yo son , 28 yo daughter )  Age at first live birth: 23  Nursed: no Hysterectomy: no Oophorectomy: yes partial left 1992 OCP: yes in the past for about 2 years (IUD) HRT: no  Last pap/pelvic exam: 4/2022 WNL  Related family history: 2 maternal cousins DCIS age 59, and age 68 Ashkenazi: no Mastery risk assessment: N/A BRCA testing: Negative VUS DESIRE and BRCA2 11/2020 Bra size: 36C   Last mammogram: 10/5/2022 left               Location: [facility]  Report reviewed.                                 Images reviewed on PACS and with patient Results: BIRADS 2 dense breasts. stable examination with no evidence of malignancy.    Last ultrasound: 10/5/2022    left            Location: [facility]  Report reviewed.                                 Images reviewed in PACS Results: BIRADS 2 No suspicious solid or cystic mass is identified.    Last MRI: 12/21/2022                                           Location: [facility] Report reviewed. Results: right breast BIRADS 2 Left breast BIRADS 1 Interval right mastectomy with flap reconstruction. No MRI evidence of malignancy,

## 2024-01-22 ENCOUNTER — NON-APPOINTMENT (OUTPATIENT)
Age: 69
End: 2024-01-22

## 2024-05-01 ENCOUNTER — APPOINTMENT (OUTPATIENT)
Dept: HEMATOLOGY ONCOLOGY | Facility: CLINIC | Age: 69
End: 2024-05-01
Payer: MEDICARE

## 2024-05-28 ENCOUNTER — LABORATORY RESULT (OUTPATIENT)
Age: 69
End: 2024-05-28

## 2024-05-29 ENCOUNTER — RESULT REVIEW (OUTPATIENT)
Age: 69
End: 2024-05-29

## 2024-05-29 ENCOUNTER — APPOINTMENT (OUTPATIENT)
Dept: HEMATOLOGY ONCOLOGY | Facility: CLINIC | Age: 69
End: 2024-05-29
Payer: MEDICARE

## 2024-05-29 VITALS
WEIGHT: 156 LBS | HEIGHT: 61 IN | SYSTOLIC BLOOD PRESSURE: 149 MMHG | BODY MASS INDEX: 29.45 KG/M2 | RESPIRATION RATE: 18 BRPM | TEMPERATURE: 98.3 F | HEART RATE: 62 BPM | DIASTOLIC BLOOD PRESSURE: 85 MMHG

## 2024-05-29 DIAGNOSIS — E66.3 OVERWEIGHT: ICD-10-CM

## 2024-05-29 DIAGNOSIS — D05.11 INTRADUCTAL CARCINOMA IN SITU OF RIGHT BREAST: ICD-10-CM

## 2024-05-29 DIAGNOSIS — E55.9 VITAMIN D DEFICIENCY, UNSPECIFIED: ICD-10-CM

## 2024-05-29 PROCEDURE — 99214 OFFICE O/P EST MOD 30 MIN: CPT

## 2024-05-29 RX ORDER — LOSARTAN POTASSIUM 50 MG/1
50 TABLET, FILM COATED ORAL
Refills: 0 | Status: ACTIVE | COMMUNITY

## 2024-05-29 RX ORDER — BUPROPION HYDROCHLORIDE 150 MG/1
150 TABLET, EXTENDED RELEASE ORAL DAILY
Qty: 30 | Refills: 3 | Status: ACTIVE | COMMUNITY
Start: 2024-05-29 | End: 1900-01-01

## 2024-05-29 NOTE — ASSESSMENT
[Patient/Caregiver not ready to engage] : Patient/Caregiver not ready to engage [Designated Health Care Proxy] : Designated Health Care Proxy [Name: ___] : Name: [unfilled] [Relationship: ___] : Relationship: [unfilled] [FreeTextEntry1] : This is a 68-year-old postmenopausal woman presenting with extensive 6 cm focus of DCIS ER negative CO negative node negative diagnosed 12/2020 at age 65.  Status post mastectomy with Sherrie flap reconstruction. Also with small focus of ALH  # DCIS /ALH -- s/p rt. mastectomy ER negative, high grade Deferring prophylaxis for lt. breast continue mammogram/ MRI ( heterogeneously dense breasts)  #. osteopenia   5/21 --osteopenia  #. vit d def  Continue vitamin D check level  # colonoscopy discussed  # Obesity - imulse controll- try wellbutrin start 150 mg then if tolerated increase to 300 mg  Follow-up in 6 months   [AdvancecareDate] : 05/29/2024

## 2024-05-29 NOTE — PHYSICAL EXAM
[de-identified] : b/l breast asymmetrical. s/p right mastectomy with reconstruction timo flap , soft crease noted to R upper pole, no masses or lesions bilaterally. multiple well healed surgical incisions. no lymphadenopathy to b/l axilla.  [de-identified] : well healed transverse surgical incision.

## 2024-05-29 NOTE — HISTORY OF PRESENT ILLNESS
[de-identified] : This is a 66-year-old woman presenting for a new diagnosis of DCIS.\par  Patient had a screening mammogram in September 2020 she developed  4 cm of microcalcifications, these were biopsied on 9/23/2020 the 12 o'clock position showed DCIS high-grade, posterior, 12 clock position anterior DCIS high-grade in the third was also DCIS high-grade.  DCIS throughout was similar and ER/MD were 0% and 0% respectively.  Patient was then seen by Dr. Zepeda in October 2020.\par  MRI of the breast in October 2020 showed branching enhancement in the upper inner quadrant extending into the upper outer quadrant in the right breast concordant with DCIS\par  in dec 2020 biopsy of nipple was negative for cancer preop. \par  Patient underwent nipple sparing mastectomy on the right on December 18, 2020 with immediate reconstruction with Sherrie flap with Dr. Zepeda and Dr. Mueller.  The extent of the DCIS was 6 cm, high-grade, again ER/MD were both negative at 1% respectively.  Final staging was stage 1, pT tis N0\par  8 lymph nodes were obtained and all negative for cancer.  Margins were clear(closest was <1mm) \par  there was also ALH present. \par  Rt deferred\par  \par  BRCA testing negative variant of undetermined significance of both  DESIRE and BRCA2( heterozygous for both) \par  \par  \par  \par   [de-identified] : Patient is here for follow up.  Reports feeling well over all Recently hospitalized for HTN emergency 226/110. Blood pressure medication adjusted. Has appt with cardiology  Also experiencing colitis symptoms/gallbladder symptoms-going for MRCP. Following with Dr. Goodman  MRI 2/2024 Dexa-2021 [facility]-Due august GYN- 2023

## 2024-05-29 NOTE — REVIEW OF SYSTEMS
[de-identified] : very stressed today- sister just passed away .  [Negative] : Psychiatric [FreeTextEntry9] : chronic arthritis  [de-identified] : resolved r breast tenderness

## 2024-07-16 ENCOUNTER — NON-APPOINTMENT (OUTPATIENT)
Age: 69
End: 2024-07-16

## 2024-08-09 ENCOUNTER — APPOINTMENT (OUTPATIENT)
Dept: BREAST CENTER | Facility: CLINIC | Age: 69
End: 2024-08-09

## 2024-08-09 PROBLEM — Z91.89 AT HIGH RISK FOR BREAST CANCER: Status: ACTIVE | Noted: 2024-08-09

## 2024-08-09 PROBLEM — R92.30 BREAST DENSITY: Status: ACTIVE | Noted: 2020-10-07

## 2024-08-09 PROCEDURE — 99213 OFFICE O/P EST LOW 20 MIN: CPT

## 2024-08-09 NOTE — HISTORY OF PRESENT ILLNESS
[FreeTextEntry1] : This is a 68 year old female referred by Dr. Calle for a right breast DCIS. She presented for screening imaging 9/9/2020 and an interval development of right breast microcalcifications was seen spanning [id number]. She is s/p three site stereotactic bx at [facility] on 9/23/2020 pathology of 12:00 posterior (top hat clip) showed DCIS, high grade; right 12:00 anterior (booker clip) DCIS, high grade; right 12N4 (barbell clip) DCIS high grade. The DCIS was thought to be histologically similar and receptors were ran on the last specimen and were ER 0%, NV 0%. She presents today for surgical consult. Denies any trauma, changes to herbs, diet or supplements. She has never had prior breast biopsy. Her sister has just been diagnosed with stage 3 lung cancer.  She is s/p right nipple delay 12/8/20 path shows benign breast nipple duct tissue. She has no post op complaints. She is s/p R NSM 12/18/20 path showed  6cm DCIS, high grade closest margin <1mm on a 4mm front, anterior/superior at skin, 0/8slne ER<1%, NV <1%, several foci ALH. She had revision surgery 6 weeks ago with Dr. Mueller. Her sister passed 4/27/2022 from stage 4 lung cancer. She had symmetrizing reduction with Dr. Mueller 10/2021. Patient gets occasional right nipple itching.  She had revision surgery with Dr. Mueller 4/25/23.  She is dealing with hypertension and following with cardiology.  She does SBE.  She has not noticed a change in her breast or a breast lump. She has noticed a change in her left nipple or nipple area.  She has not noticed a change in the skin of the breast. except post bx bruising She is not experiencing nipple discharge. She is not experiencing breast pain. She has not noticed a lump or lymph node under the armpit.   BREAST CANCER RISK FACTORS Menarche: 10 Date of LMP: 2005 Menopause: post age 54 Grav:  3    Para: 2 (44 yo son , 34 yo daughter )  Age at first live birth: 23  Nursed: no Hysterectomy: no Oophorectomy: yes partial left 1992 OCP: yes in the past for about 2 years (IUD) HRT: no  Last pap/pelvic exam: 8/2023 WNL  Related family history: 2 maternal cousins DCIS age 59, and age 68 Ashkenazi: no Mastery risk assessment: N/A BRCA testing: Negative VUS DESIRE and BRCA2 11/2020 Bra size: 36C   Last mammogram: 11/8/23 left               Location: [facility]  Report reviewed.                                 Images reviewed on PACS and with patient Results: BIRADS 2 The breasts are heterogeneously dense. stable examination with no evidence of malignancy.    Last ultrasound: 11/8/23   left            Location: [facility]  Report reviewed.                                 Images reviewed in PACS Results: BIRADS 2 No suspicious solid or cystic mass is identified.    Last MRI: 2/7/24                                          Location: [facility] Report reviewed. Results: right breast BIRADS 2 Left breast BIRADS 1 Interval right mastectomy with flap reconstruction. No MRI evidence of malignancy,

## 2024-08-09 NOTE — CONSULT LETTER
[Dear  ___] : Dear  [unfilled], [Courtesy Letter:] : I had the pleasure of seeing your patient, [unfilled], in my office today. [Please see my note below.] : Please see my note below. [Consult Closing:] : Thank you very much for allowing me to participate in the care of this patient.  If you have any questions, please do not hesitate to contact me. [Sincerely,] : Sincerely, [FreeTextEntry3] : Alexsandra [place] [address] [DrTara  ___] : Dr. SORENSEN

## 2024-08-09 NOTE — PHYSICAL EXAM
[Normocephalic] : normocephalic [Atraumatic] : atraumatic [Supple] : supple [No Supraclavicular Adenopathy] : no supraclavicular adenopathy [Examined in the supine and seated position] : examined in the supine and seated position [Symmetrical] : symmetrical [No dominant masses] : no dominant masses in right breast  [No dominant masses] : no dominant masses left breast [No Nipple Retraction] : no left nipple retraction [No Nipple Discharge] : no left nipple discharge [No Axillary Lymphadenopathy] : no left axillary lymphadenopathy [No Edema] : no edema [No Rashes] : no rashes [No Ulceration] : no ulceration [de-identified] : viable NAC, there is crease in upper pole and nipple is improved [de-identified] : s/p reduction mammoplasty, in area of patient concern there is a red raised area which fluctuates

## 2024-08-09 NOTE — ASSESSMENT
[FreeTextEntry1] : 67 yo female with right DCIS plan left Mg/sono 11/2024 plan MRI 5/2025 We reviewed risk reduction strategies including maintaining a BMI <25, limiting red meat intake and alcoholic beverages to 3 per week and exercise (150 min/ week low intensity or 75 min/week high intensity). And maintaining a normal vitamin D level  and stress management strategies.  cont surveillance per Dr. Jeter LDEX today and next one prn f/u 1 year  She knows to call or return sooner should any concerns or questions arise.

## 2024-09-04 ENCOUNTER — LABORATORY RESULT (OUTPATIENT)
Age: 69
End: 2024-09-04

## 2024-09-04 ENCOUNTER — RESULT REVIEW (OUTPATIENT)
Age: 69
End: 2024-09-04

## 2024-09-04 ENCOUNTER — APPOINTMENT (OUTPATIENT)
Dept: HEMATOLOGY ONCOLOGY | Facility: CLINIC | Age: 69
End: 2024-09-04
Payer: MEDICARE

## 2024-09-04 VITALS
BODY MASS INDEX: 29.77 KG/M2 | WEIGHT: 157.7 LBS | TEMPERATURE: 97.5 F | SYSTOLIC BLOOD PRESSURE: 150 MMHG | OXYGEN SATURATION: 96 % | HEART RATE: 84 BPM | RESPIRATION RATE: 18 BRPM | DIASTOLIC BLOOD PRESSURE: 84 MMHG | HEIGHT: 61 IN

## 2024-09-04 DIAGNOSIS — D05.11 INTRADUCTAL CARCINOMA IN SITU OF RIGHT BREAST: ICD-10-CM

## 2024-09-04 DIAGNOSIS — I10 ESSENTIAL (PRIMARY) HYPERTENSION: ICD-10-CM

## 2024-09-04 DIAGNOSIS — E66.3 OVERWEIGHT: ICD-10-CM

## 2024-09-04 DIAGNOSIS — R53.83 OTHER FATIGUE: ICD-10-CM

## 2024-09-04 PROCEDURE — 99214 OFFICE O/P EST MOD 30 MIN: CPT

## 2024-09-04 PROCEDURE — 36415 COLL VENOUS BLD VENIPUNCTURE: CPT

## 2024-09-04 RX ORDER — LEVOTHYROXINE SODIUM 25 UG/1
25 CAPSULE ORAL
Refills: 0 | Status: ACTIVE | COMMUNITY

## 2024-09-04 RX ORDER — LOSARTAN POTASSIUM 100 MG/1
100 TABLET, FILM COATED ORAL
Refills: 0 | Status: ACTIVE | COMMUNITY

## 2024-09-04 RX ORDER — PNV NO.95/FERROUS FUM/FOLIC AC 28MG-0.8MG
TABLET ORAL
Refills: 0 | Status: ACTIVE | COMMUNITY

## 2024-09-04 RX ORDER — AMLODIPINE BESYLATE 5 MG/1
5 TABLET ORAL
Refills: 0 | Status: ACTIVE | COMMUNITY

## 2024-09-04 NOTE — REVIEW OF SYSTEMS
[Joint Stiffness] : joint stiffness [Anxiety] : anxiety [Depression] : depression [Negative] : Allergic/Immunologic [Fatigue] : fatigue [Recent Change In Weight] : ~T recent weight change [FreeTextEntry2] : tired at times, groggy in the middle of the day; reports gaining weight [FreeTextEntry9] : chronic arthritis  [de-identified] : resolved r breast tenderness

## 2024-09-04 NOTE — REVIEW OF SYSTEMS
[Joint Stiffness] : joint stiffness [Anxiety] : anxiety [Depression] : depression [Negative] : Allergic/Immunologic [Fatigue] : fatigue [Recent Change In Weight] : ~T recent weight change [FreeTextEntry2] : tired at times, groggy in the middle of the day; reports gaining weight [FreeTextEntry9] : chronic arthritis  [de-identified] : resolved r breast tenderness

## 2024-09-04 NOTE — HISTORY OF PRESENT ILLNESS
[de-identified] : This is a 66-year-old woman presenting for a new diagnosis of DCIS.\par  Patient had a screening mammogram in September 2020 she developed  4 cm of microcalcifications, these were biopsied on 9/23/2020 the 12 o'clock position showed DCIS high-grade, posterior, 12 clock position anterior DCIS high-grade in the third was also DCIS high-grade.  DCIS throughout was similar and ER/VA were 0% and 0% respectively.  Patient was then seen by Dr. Zepeda in October 2020.\par  MRI of the breast in October 2020 showed branching enhancement in the upper inner quadrant extending into the upper outer quadrant in the right breast concordant with DCIS\par  in dec 2020 biopsy of nipple was negative for cancer preop. \par  Patient underwent nipple sparing mastectomy on the right on December 18, 2020 with immediate reconstruction with Sherrie flap with Dr. Zepeda and Dr. Mueller.  The extent of the DCIS was 6 cm, high-grade, again ER/VA were both negative at 1% respectively.  Final staging was stage 1, pT tis N0\par  8 lymph nodes were obtained and all negative for cancer.  Margins were clear(closest was <1mm) \par  there was also ALH present. \par  Rt deferred\par  \par  BRCA testing negative variant of undetermined significance of both  DESIRE and BRCA2( heterozygous for both) \par  \par  \par  \par   [de-identified] : Patient is here for follow up. Reports feeling well over all Recently hospitalized for HTN emergency 226/110 on 08/02. Was having headahces, flushed and dizziness when she went to ER.  Blood pressure medication adjusted and BP controlled since hospitalization. Has appt with cardiology 09/30.  Patient recently started on thyroid medication. Feels like the Welbutrin is making her groggy and tired - has not helped to suppress appetite.   MRI 2/2024 Dexa-2021 [facility]-Due august GYN- 2023

## 2024-09-04 NOTE — PHYSICAL EXAM
[Restricted in physically strenuous activity but ambulatory and able to carry out work of a light or sedentary nature] : Status 1- Restricted in physically strenuous activity but ambulatory and able to carry out work of a light or sedentary nature, e.g., light house work, office work [Normal] : affect appropriate [de-identified] : b/l breast asymmetrical. s/p right mastectomy with reconstruction timo flap , soft crease noted to R upper pole, no masses or lesions bilaterally. multiple well healed surgical incisions. no lymphadenopathy to b/l axilla.  [de-identified] : well healed transverse surgical incision.

## 2024-09-04 NOTE — ASSESSMENT
[Patient/Caregiver not ready to engage] : Patient/Caregiver not ready to engage [Designated Health Care Proxy] : Designated Health Care Proxy [Name: ___] : Name: [unfilled] [Relationship: ___] : Relationship: [unfilled] [FreeTextEntry1] : This is a 68-year-old postmenopausal woman presenting with extensive 6 cm focus of DCIS ER negative PA negative node negative diagnosed 12/2020 at age 65.  Status post mastectomy with Sherrie flap reconstruction. Also with small focus of ALH  # DCIS /ALH -- s/p rt. mastectomy ER negative, high grade Deferring prophylaxis for lt. breast continue mammogram/ MRI ( heterogeneously dense breasts)  HTN - reviewed diet, stop using liquid IV, salt.  - used steroids for back pain, always 2 weeks after  # Fatigue - sleeps well 11-30 pm- 8 am, wakes up early    #. osteopenia   5/21 --osteopenia  #. vit d def  Continue vitamin D check level  # colonoscopy discussed  # Obesity - impulse control- try Wellbutrin start 150 mg then if tolerated increase to 300 mg - exercises Follow-up in 12 months   [AdvancecareDate] : 05/29/2024

## 2024-09-04 NOTE — ASSESSMENT
[Patient/Caregiver not ready to engage] : Patient/Caregiver not ready to engage [Designated Health Care Proxy] : Designated Health Care Proxy [Name: ___] : Name: [unfilled] [Relationship: ___] : Relationship: [unfilled] [FreeTextEntry1] : This is a 68-year-old postmenopausal woman presenting with extensive 6 cm focus of DCIS ER negative AR negative node negative diagnosed 12/2020 at age 65.  Status post mastectomy with Sherrie flap reconstruction. Also with small focus of ALH  # DCIS /ALH -- s/p rt. mastectomy ER negative, high grade Deferring prophylaxis for lt. breast continue mammogram/ MRI ( heterogeneously dense breasts)  HTN - reviewed diet, stop using liquid IV, salt.  - used steroids for back pain, always 2 weeks after  # Fatigue - sleeps well 11-30 pm- 8 am, wakes up early    #. osteopenia   5/21 --osteopenia  #. vit d def  Continue vitamin D check level  # colonoscopy discussed  # Obesity - impulse control- try Wellbutrin start 150 mg then if tolerated increase to 300 mg - exercises Follow-up in 12 months   [AdvancecareDate] : 05/29/2024

## 2024-09-04 NOTE — PHYSICAL EXAM
[Restricted in physically strenuous activity but ambulatory and able to carry out work of a light or sedentary nature] : Status 1- Restricted in physically strenuous activity but ambulatory and able to carry out work of a light or sedentary nature, e.g., light house work, office work [Normal] : affect appropriate [de-identified] : b/l breast asymmetrical. s/p right mastectomy with reconstruction timo flap , soft crease noted to R upper pole, no masses or lesions bilaterally. multiple well healed surgical incisions. no lymphadenopathy to b/l axilla.  [de-identified] : well healed transverse surgical incision.

## 2024-09-04 NOTE — HISTORY OF PRESENT ILLNESS
[de-identified] : This is a 66-year-old woman presenting for a new diagnosis of DCIS.\par  Patient had a screening mammogram in September 2020 she developed  4 cm of microcalcifications, these were biopsied on 9/23/2020 the 12 o'clock position showed DCIS high-grade, posterior, 12 clock position anterior DCIS high-grade in the third was also DCIS high-grade.  DCIS throughout was similar and ER/MA were 0% and 0% respectively.  Patient was then seen by Dr. Zepeda in October 2020.\par  MRI of the breast in October 2020 showed branching enhancement in the upper inner quadrant extending into the upper outer quadrant in the right breast concordant with DCIS\par  in dec 2020 biopsy of nipple was negative for cancer preop. \par  Patient underwent nipple sparing mastectomy on the right on December 18, 2020 with immediate reconstruction with Sherrie flap with Dr. Zepeda and Dr. Mueller.  The extent of the DCIS was 6 cm, high-grade, again ER/MA were both negative at 1% respectively.  Final staging was stage 1, pT tis N0\par  8 lymph nodes were obtained and all negative for cancer.  Margins were clear(closest was <1mm) \par  there was also ALH present. \par  Rt deferred\par  \par  BRCA testing negative variant of undetermined significance of both  DESIRE and BRCA2( heterozygous for both) \par  \par  \par  \par   [de-identified] : Patient is here for follow up. Reports feeling well over all Recently hospitalized for HTN emergency 226/110 on 08/02. Was having headahces, flushed and dizziness when she went to ER.  Blood pressure medication adjusted and BP controlled since hospitalization. Has appt with cardiology 09/30.  Patient recently started on thyroid medication. Feels like the Welbutrin is making her groggy and tired - has not helped to suppress appetite.   MRI 2/2024 Dexa-2021 [facility]-Due august GYN- 2023

## 2025-08-01 NOTE — PHYSICAL EXAM
Patient's most recent potassium results are listed below.   Recent Labs     08/01/25  0200   K 2.7*     Plan  - Replete potassium per protocol  - Monitor potassium Daily  - Patient's hypokalemia is stable   [Normocephalic] : normocephalic [Atraumatic] : atraumatic [Supple] : supple [No Supraclavicular Adenopathy] : no supraclavicular adenopathy [Examined in the supine and seated position] : examined in the supine and seated position [Symmetrical] : symmetrical [No dominant masses] : no dominant masses left breast [No Nipple Retraction] : no left nipple retraction [No Nipple Discharge] : no left nipple discharge [No Axillary Lymphadenopathy] : no left axillary lymphadenopathy [No Edema] : no edema [No Rashes] : no rashes [No Ulceration] : no ulceration [Grade 3] : Ptosis Grade 3 [de-identified] : healing bx sites

## 2025-08-12 ENCOUNTER — APPOINTMENT (OUTPATIENT)
Dept: BREAST CENTER | Facility: CLINIC | Age: 70
End: 2025-08-12
Payer: MEDICARE

## 2025-08-12 VITALS
SYSTOLIC BLOOD PRESSURE: 134 MMHG | BODY MASS INDEX: 28.7 KG/M2 | WEIGHT: 152 LBS | DIASTOLIC BLOOD PRESSURE: 80 MMHG | HEIGHT: 61 IN

## 2025-08-12 DIAGNOSIS — Z78.9 OTHER SPECIFIED HEALTH STATUS: ICD-10-CM

## 2025-08-12 DIAGNOSIS — D05.11 INTRADUCTAL CARCINOMA IN SITU OF RIGHT BREAST: ICD-10-CM

## 2025-08-12 DIAGNOSIS — Z12.31 ENCOUNTER FOR SCREENING MAMMOGRAM FOR MALIGNANT NEOPLASM OF BREAST: ICD-10-CM

## 2025-08-12 DIAGNOSIS — Z91.89 OTHER SPECIFIED PERSONAL RISK FACTORS, NOT ELSEWHERE CLASSIFIED: ICD-10-CM

## 2025-08-12 DIAGNOSIS — Z80.0 FAMILY HISTORY OF MALIGNANT NEOPLASM OF DIGESTIVE ORGANS: ICD-10-CM

## 2025-08-12 DIAGNOSIS — R92.30 DENSE BREASTS, UNSPECIFIED: ICD-10-CM

## 2025-08-12 PROCEDURE — 99214 OFFICE O/P EST MOD 30 MIN: CPT

## 2025-08-12 RX ORDER — TIRZEPATIDE 15 MG/.5ML
INJECTION, SOLUTION SUBCUTANEOUS
Refills: 0 | Status: ACTIVE | COMMUNITY

## 2025-08-12 RX ORDER — CARVEDILOL 3.12 MG/1
TABLET, FILM COATED ORAL
Refills: 0 | Status: ACTIVE | COMMUNITY

## 2025-09-09 ENCOUNTER — NON-APPOINTMENT (OUTPATIENT)
Age: 70
End: 2025-09-09

## 2025-09-10 ENCOUNTER — RESULT REVIEW (OUTPATIENT)
Age: 70
End: 2025-09-10

## 2025-09-10 ENCOUNTER — APPOINTMENT (OUTPATIENT)
Dept: HEMATOLOGY ONCOLOGY | Facility: CLINIC | Age: 70
End: 2025-09-10
Payer: MEDICARE

## 2025-09-10 VITALS
RESPIRATION RATE: 18 BRPM | TEMPERATURE: 97.9 F | BODY MASS INDEX: 28.51 KG/M2 | SYSTOLIC BLOOD PRESSURE: 109 MMHG | HEART RATE: 78 BPM | HEIGHT: 61 IN | OXYGEN SATURATION: 95 % | WEIGHT: 151 LBS | DIASTOLIC BLOOD PRESSURE: 70 MMHG

## 2025-09-10 DIAGNOSIS — R92.30 DENSE BREASTS, UNSPECIFIED: ICD-10-CM

## 2025-09-10 DIAGNOSIS — R53.83 OTHER FATIGUE: ICD-10-CM

## 2025-09-10 DIAGNOSIS — D05.11 INTRADUCTAL CARCINOMA IN SITU OF RIGHT BREAST: ICD-10-CM

## 2025-09-10 DIAGNOSIS — E66.3 OVERWEIGHT: ICD-10-CM

## 2025-09-10 PROCEDURE — G2211 COMPLEX E/M VISIT ADD ON: CPT

## 2025-09-10 PROCEDURE — 99213 OFFICE O/P EST LOW 20 MIN: CPT
